# Patient Record
Sex: FEMALE | Race: WHITE | NOT HISPANIC OR LATINO
[De-identification: names, ages, dates, MRNs, and addresses within clinical notes are randomized per-mention and may not be internally consistent; named-entity substitution may affect disease eponyms.]

---

## 2017-03-09 ENCOUNTER — APPOINTMENT (OUTPATIENT)
Dept: PULMONOLOGY | Facility: CLINIC | Age: 32
End: 2017-03-09

## 2017-03-09 VITALS
SYSTOLIC BLOOD PRESSURE: 120 MMHG | WEIGHT: 293 LBS | HEIGHT: 66 IN | RESPIRATION RATE: 16 BRPM | OXYGEN SATURATION: 100 % | HEART RATE: 73 BPM | DIASTOLIC BLOOD PRESSURE: 76 MMHG | BODY MASS INDEX: 47.09 KG/M2

## 2017-03-09 DIAGNOSIS — Z87.19 PERSONAL HISTORY OF OTHER DISEASES OF THE DIGESTIVE SYSTEM: ICD-10-CM

## 2017-03-09 DIAGNOSIS — Z86.39 PERSONAL HISTORY OF OTHER ENDOCRINE, NUTRITIONAL AND METABOLIC DISEASE: ICD-10-CM

## 2017-03-09 DIAGNOSIS — Z78.9 OTHER SPECIFIED HEALTH STATUS: ICD-10-CM

## 2017-03-09 DIAGNOSIS — Z82.49 FAMILY HISTORY OF ISCHEMIC HEART DISEASE AND OTHER DISEASES OF THE CIRCULATORY SYSTEM: ICD-10-CM

## 2017-03-09 DIAGNOSIS — E66.3 OVERWEIGHT: ICD-10-CM

## 2017-04-27 ENCOUNTER — APPOINTMENT (OUTPATIENT)
Dept: PULMONOLOGY | Facility: CLINIC | Age: 32
End: 2017-04-27

## 2017-05-11 ENCOUNTER — APPOINTMENT (OUTPATIENT)
Dept: PULMONOLOGY | Facility: CLINIC | Age: 32
End: 2017-05-11

## 2017-05-11 VITALS
SYSTOLIC BLOOD PRESSURE: 128 MMHG | DIASTOLIC BLOOD PRESSURE: 70 MMHG | WEIGHT: 293 LBS | OXYGEN SATURATION: 99 % | BODY MASS INDEX: 47.09 KG/M2 | HEIGHT: 66 IN | HEART RATE: 68 BPM | RESPIRATION RATE: 15 BRPM

## 2017-05-22 ENCOUNTER — MESSAGE (OUTPATIENT)
Age: 32
End: 2017-05-22

## 2017-06-07 ENCOUNTER — APPOINTMENT (OUTPATIENT)
Dept: THORACIC SURGERY | Facility: CLINIC | Age: 32
End: 2017-06-07

## 2017-06-08 VITALS
SYSTOLIC BLOOD PRESSURE: 135 MMHG | DIASTOLIC BLOOD PRESSURE: 85 MMHG | HEIGHT: 66 IN | RESPIRATION RATE: 15 BRPM | BODY MASS INDEX: 47.09 KG/M2 | OXYGEN SATURATION: 98 % | HEART RATE: 66 BPM | WEIGHT: 293 LBS

## 2017-06-08 RX ORDER — DICLOFENAC SODIUM AND MISOPROSTOL 50; 200 MG/1; UG/1
50-0.2 TABLET, DELAYED RELEASE ORAL
Qty: 60 | Refills: 2 | Status: COMPLETED | COMMUNITY
Start: 2017-05-11 | End: 2017-06-08

## 2017-06-08 RX ORDER — RANITIDINE 300 MG/1
300 TABLET ORAL
Qty: 1 | Refills: 1 | Status: COMPLETED | COMMUNITY
Start: 2017-03-09 | End: 2017-06-08

## 2017-06-08 RX ORDER — DEXLANSOPRAZOLE 60 MG/1
60 CAPSULE, DELAYED RELEASE ORAL
Refills: 0 | Status: COMPLETED | COMMUNITY
End: 2017-06-08

## 2017-07-11 ENCOUNTER — APPOINTMENT (OUTPATIENT)
Dept: PULMONOLOGY | Facility: CLINIC | Age: 32
End: 2017-07-11

## 2017-07-11 VITALS
HEART RATE: 60 BPM | WEIGHT: 293 LBS | BODY MASS INDEX: 47.09 KG/M2 | SYSTOLIC BLOOD PRESSURE: 115 MMHG | DIASTOLIC BLOOD PRESSURE: 80 MMHG | HEIGHT: 66 IN | RESPIRATION RATE: 16 BRPM | OXYGEN SATURATION: 100 %

## 2018-01-08 ENCOUNTER — APPOINTMENT (OUTPATIENT)
Dept: PULMONOLOGY | Facility: CLINIC | Age: 33
End: 2018-01-08
Payer: COMMERCIAL

## 2018-01-08 VITALS
BODY MASS INDEX: 47.09 KG/M2 | HEIGHT: 66 IN | HEART RATE: 79 BPM | SYSTOLIC BLOOD PRESSURE: 126 MMHG | WEIGHT: 293 LBS | DIASTOLIC BLOOD PRESSURE: 80 MMHG | OXYGEN SATURATION: 99 %

## 2018-01-08 PROCEDURE — 94010 BREATHING CAPACITY TEST: CPT

## 2018-01-08 PROCEDURE — 99214 OFFICE O/P EST MOD 30 MIN: CPT | Mod: 25

## 2018-07-13 ENCOUNTER — NON-APPOINTMENT (OUTPATIENT)
Age: 33
End: 2018-07-13

## 2018-07-13 ENCOUNTER — APPOINTMENT (OUTPATIENT)
Dept: PULMONOLOGY | Facility: CLINIC | Age: 33
End: 2018-07-13
Payer: COMMERCIAL

## 2018-07-13 VITALS
WEIGHT: 293 LBS | OXYGEN SATURATION: 98 % | SYSTOLIC BLOOD PRESSURE: 110 MMHG | RESPIRATION RATE: 17 BRPM | BODY MASS INDEX: 47.09 KG/M2 | HEART RATE: 77 BPM | DIASTOLIC BLOOD PRESSURE: 70 MMHG | HEIGHT: 66 IN

## 2018-07-13 PROCEDURE — 99214 OFFICE O/P EST MOD 30 MIN: CPT | Mod: 25

## 2018-07-13 PROCEDURE — 94010 BREATHING CAPACITY TEST: CPT

## 2019-01-15 ENCOUNTER — NON-APPOINTMENT (OUTPATIENT)
Age: 34
End: 2019-01-15

## 2019-01-15 ENCOUNTER — APPOINTMENT (OUTPATIENT)
Dept: PULMONOLOGY | Facility: CLINIC | Age: 34
End: 2019-01-15
Payer: COMMERCIAL

## 2019-01-15 VITALS
HEIGHT: 66 IN | RESPIRATION RATE: 17 BRPM | WEIGHT: 293 LBS | SYSTOLIC BLOOD PRESSURE: 110 MMHG | HEART RATE: 71 BPM | OXYGEN SATURATION: 98 % | BODY MASS INDEX: 47.09 KG/M2 | DIASTOLIC BLOOD PRESSURE: 80 MMHG

## 2019-01-15 PROCEDURE — 94010 BREATHING CAPACITY TEST: CPT

## 2019-01-15 PROCEDURE — 99214 OFFICE O/P EST MOD 30 MIN: CPT | Mod: 25

## 2019-01-15 NOTE — REVIEW OF SYSTEMS
[Negative] : Sleep Disorder [Dyspnea] : dyspnea [As Noted in HPI] : as noted in HPI [Myalgias] : myalgias [FreeTextEntry8] : RAMIREZ

## 2019-01-15 NOTE — HISTORY OF PRESENT ILLNESS
[FreeTextEntry1] : Ms. Leroy is a 33 year old female presenting to the office for a follow up visit for abnormal PFTs, GERD, snoring and chest pain. Her chief complaint is RAMIREZ\par -she states she is moving to a better location in [place]\par -she notes her energy level is good 8-9/10\par -she states she sleeps well, 6.5-7 hours each night, including weekends\par -she states her sleep is not interrupted\par -she states she believes her collar bone pain may be muscular\par -she notes she sleeps on her stomach\par -she states she has neck pain\par -she states her weight is stable, would like to lose weight\par -she states she takes Tums for reflux with tomato based foods or wine\par -she states she has RAMIREZ with inclines\par -she notes her bowels are regular\par -she states her sense of smell and taste are normal\par -she denies any headaches, nausea, vomiting, fever, chills, sweats, chest pain, chest pressure, diarrhea, constipation, dysphagia, dizziness, leg swelling, leg pain, itchy eyes, itchy ears, heartburn, reflux, or sour taste in the mouth.

## 2019-01-15 NOTE — ADDENDUM
[FreeTextEntry1] : Documented by Branden Powres acting as a scribe for Dr. Yg Barnes on 01/15/2019.\par \par All medical record entries made by the Scribe were at my, Dr. Yg Barnes's, direction and personally dictated by me on 01/15/2019. I have reviewed the chart and agree that the record accurately reflects my personal performance of the history, physical exam, assessment and plan. I have also personally directed, reviewed, and agree with the discharge instructions.

## 2019-01-15 NOTE — PHYSICAL EXAM
[General Appearance - Well Developed] : well developed [Normal Appearance] : normal appearance [Well Groomed] : well groomed [General Appearance - Well Nourished] : well nourished [No Deformities] : no deformities [General Appearance - In No Acute Distress] : no acute distress [Normal Conjunctiva] : the conjunctiva exhibited no abnormalities [Eyelids - No Xanthelasma] : the eyelids demonstrated no xanthelasmas [Normal Oropharynx] : normal oropharynx [III] : III [Neck Appearance] : the appearance of the neck was normal [Neck Cervical Mass (___cm)] : no neck mass was observed [Jugular Venous Distention Increased] : there was no jugular-venous distention [Thyroid Diffuse Enlargement] : the thyroid was not enlarged [Thyroid Nodule] : there were no palpable thyroid nodules [Heart Rate And Rhythm] : heart rate and rhythm were normal [Heart Sounds] : normal S1 and S2 [Murmurs] : no murmurs present [Respiration, Rhythm And Depth] : normal respiratory rhythm and effort [Exaggerated Use Of Accessory Muscles For Inspiration] : no accessory muscle use [Auscultation Breath Sounds / Voice Sounds] : lungs were clear to auscultation bilaterally [Abdomen Soft] : soft [Abdomen Tenderness] : non-tender [Abdomen Mass (___ Cm)] : no abdominal mass palpated [Abnormal Walk] : normal gait [Gait - Sufficient For Exercise Testing] : the gait was sufficient for exercise testing [Nail Clubbing] : no clubbing of the fingernails [Cyanosis, Localized] : no localized cyanosis [Petechial Hemorrhages (___cm)] : no petechial hemorrhages [Skin Color & Pigmentation] : normal skin color and pigmentation [] : no rash [No Venous Stasis] : no venous stasis [Skin Lesions] : no skin lesions [No Skin Ulcers] : no skin ulcer [No Xanthoma] : no  xanthoma was observed [Deep Tendon Reflexes (DTR)] : deep tendon reflexes were 2+ and symmetric [Sensation] : the sensory exam was normal to light touch and pinprick [No Focal Deficits] : no focal deficits [Oriented To Time, Place, And Person] : oriented to person, place, and time [Impaired Insight] : insight and judgment were intact [Affect] : the affect was normal [FreeTextEntry1] : reproducible L subclavicular pain

## 2019-01-15 NOTE — PROCEDURE
[FreeTextEntry1] : PFT-spi reveals normal flows with FEV1 of 3.42     , which is 103  % of predicted, normal flow volume loop

## 2019-01-15 NOTE — ASSESSMENT
[FreeTextEntry1] : Ms. Dyer is currently stable from a pulmonary perspective, stable job, improved L chest, abdominal pain, chest pain\par \par Problem 1: L sided abdominal pain (mildly present) -c/w MJ\par -recommended Topricin cream\par -relatively resolved \par -c/w musculoskeletal pain (?costochondritis)\par -continue  Arthrotec PRN\par -can continue to use Arnica cream \par -demonstrated some stretches for the pt\par -recommended to try massage therapy\par \par Problem 2: Obesity\par -recommended diet\par -discussed mindful vs mindless eating\par -Weight loss, exercise, and diet control were discussed and are highly encouraged. Treatment options were given such as, aqua therapy, and contacting a nutritionist. Recommended to use the elliptical, stationary bike, less use of treadmill. Obesity is associated with worsening asthma, shortness of breath, and potential for cardiac disease, diabetes, and other underlying medical conditions. \par \par Problem 3:primary snoring\par -recommended to use positional sleep \par \par Problem 4: Reflux/Esophageal ulcer/GERD\par -continue OTC Rx PRN\par -diet control\par \par -discussed Rule of 2's; pt should avoid eating too much; too fast; too spicy; too lousy; less than two hours before bed\par -Things to avoid including overeating, spicy foods, tight clothing, eating within three hours of bed, this list is not all inclusive. \par -For treatment of reflux, possible options discussed including diet control, H2 blockers, PPIs, as well as coating motility agents discussed as treatment options. Timing of meals and proximity of last meal to sleep were discussed. If symptoms persist, a formal gastrointestinal evaluation is needed. \par \par Problem 5: Abnormal PFT's (flattened inspiratory limb)\par -have normalized and improved \par \par problem 6: SOB\par -c/w poor mechanics of breathing\par Proper breathing techniques were reviewed with an emphasis of exhalation. Patient instructed to breath in\par for 1 second and out for four seconds. Patient was encouraged to not talk while walking. \par -out of shape\par -medicines reviewed\par \par problem 7: health maintenance \par -recommended yearly flu shot ?2019\par -recommended strep pneumonia vaccines: Prevnar-13 vaccine, followed by Pneumo vaccine 23 one year following\par -recommended early intervention for URIs\par -recommended regular osteoporosis evaluations\par -recommended early dermatological evaluations\par -recommended after the age of 50 to the age of 70, colonoscopy every 5 years \par \par F/U in x6 months \par She is encouraged to call with any changes, concerns, or questions. \par

## 2019-01-15 NOTE — REASON FOR VISIT
[Follow-Up] : a follow-up visit [FreeTextEntry1] : abnormal PFTs, GERD, snoring, chest pain, RAMIREZ

## 2019-10-31 ENCOUNTER — NON-APPOINTMENT (OUTPATIENT)
Age: 34
End: 2019-10-31

## 2019-10-31 ENCOUNTER — APPOINTMENT (OUTPATIENT)
Dept: PULMONOLOGY | Facility: CLINIC | Age: 34
End: 2019-10-31
Payer: COMMERCIAL

## 2019-10-31 VITALS
RESPIRATION RATE: 17 BRPM | HEIGHT: 66 IN | HEART RATE: 76 BPM | SYSTOLIC BLOOD PRESSURE: 118 MMHG | WEIGHT: 293 LBS | OXYGEN SATURATION: 98 % | BODY MASS INDEX: 47.09 KG/M2 | DIASTOLIC BLOOD PRESSURE: 80 MMHG

## 2019-10-31 PROCEDURE — 94010 BREATHING CAPACITY TEST: CPT

## 2019-10-31 PROCEDURE — 99214 OFFICE O/P EST MOD 30 MIN: CPT | Mod: 25

## 2019-10-31 NOTE — HISTORY OF PRESENT ILLNESS
[FreeTextEntry1] : Ms. Leroy is a 33 year old female presenting to the office for a follow up visit for abnormal PFTs, GERD, snoring and chest pain. Her chief complaint is her musculoskeletal tightness in her shoulder/neck area. \par \par - States that she is feeling well overall but has a residual cough \par - Energy level is an 8/10\par - Exercise is her normal commute  but she states she needs to do more exercise\par - She states she sleeps on her stomach and reports some musculoskeletal tightness in her shoulders/neck area\par - She states that she could eat better/fruits and vegetables\par - denies any new medications and she states she took some elderberry for the cough and also uses NyQuil\par - Sometimes when she lays down, she states she has cough\par - denies post nasal drip \par - she states that her stomach pain has improved since before \par - no longer experiencing heartburn, reflux and snoring \par \par denies any  chest pain, chest pressure, diarrhea, constipation, dysphagia, dizziness, leg swelling, leg pain, itchy eyes, itchy ears, heartburn, reflux, or sour taste in the mouth.\par

## 2019-10-31 NOTE — ADDENDUM
[FreeTextEntry1] : Documented by Georges Chowdhury acting as a scribe for Dr. Yg Barnes on 10/31/2019 \par \par All medical record entries made by the Scribe were at my, Dr. Yg Barnes's, direction and personally dictated by me on 10/31/2019 . I have reviewed the chart and agree that the record accurately reflects my personal performance of the history, physical exam, assessment and plan. I have also personally directed, reviewed, and agree with the discharge instructions.\par

## 2019-10-31 NOTE — PROCEDURE
[FreeTextEntry1] : PFT revealed normal flows, with a FEV1 of  3.66L, which is 111% of predicted, with a normal flow volume loop. \par \par \par

## 2019-10-31 NOTE — REVIEW OF SYSTEMS
[Dyspnea] : dyspnea [As Noted in HPI] : as noted in HPI [Myalgias] : myalgias [Negative] : Sleep Disorder [FreeTextEntry8] : RAMIREZ

## 2019-10-31 NOTE — ASSESSMENT
[FreeTextEntry1] : Ms. Dyer is currently stable from a pulmonary perspective, stable SOB, improved (L chest, abdominal pain, chest pain) now residual cough. \par \par Problem 1: L sided abdominal pain (mildly present) -c/w MS\par -recommended Topricin cream\par -relatively resolved \par -c/w musculoskeletal pain (?costochondritis)\par -continue  Arthrotec PRN\par -can continue to use Arnica cream/Australian Dream\par -demonstrated some stretches for the pt\par -recommended to try massage therapy\par \par Problem 2: Obesity\par -recommended diet\par -discussed mindful vs mindless eating\par -Weight loss, exercise, and diet control were discussed and are highly encouraged. Treatment options were given such as, aqua therapy, and contacting a nutritionist. Recommended to use the elliptical, stationary bike, less use of treadmill. Obesity is associated with worsening asthma, shortness of breath, and potential for cardiac disease, diabetes, and other underlying medical conditions. \par \par Problem 3:primary snoring\par -recommended to use positional sleep \par \par Problem 4: Reflux/Esophageal ulcer/GERD\par -continue OTC Rx PRN\par -diet control\par \par -discussed Rule of 2's; pt should avoid eating too much; too fast; too spicy; too lousy; less than two hours before bed\par -Things to avoid including overeating, spicy foods, tight clothing, eating within three hours of bed, this list is not all inclusive. \par -For treatment of reflux, possible options discussed including diet control, H2 blockers, PPIs, as well as coating motility agents discussed as treatment options. Timing of meals and proximity of last meal to sleep were discussed. If symptoms persist, a formal gastrointestinal evaluation is needed. \par \par Problem 5: Abnormal PFT's (flattened inspiratory limb)\par -have normalized and improved \par \par problem 6: SOB\par -c/w poor mechanics of breathing\par Proper breathing techniques were reviewed with an emphasis of exhalation. Patient instructed to breath in\par for 1 second and out for four seconds. Patient was encouraged to not talk while walking. \par -out of shape\par -medicines reviewed\par \par problem 7: health maintenance \par -recommended yearly flu shot ?2019\par -recommended strep pneumonia vaccines: Prevnar-13 vaccine, followed by Pneumo vaccine 23 one year following\par -recommended early intervention for URIs\par -recommended regular osteoporosis evaluations\par -recommended early dermatological evaluations\par -recommended after the age of 50 to the age of 70, colonoscopy every 5 years \par \par F/U in x6 months \par She is encouraged to call with any changes, concerns, or questions. \par

## 2020-04-23 ENCOUNTER — APPOINTMENT (OUTPATIENT)
Dept: PULMONOLOGY | Facility: CLINIC | Age: 35
End: 2020-04-23
Payer: COMMERCIAL

## 2020-04-23 VITALS
HEART RATE: 86 BPM | TEMPERATURE: 98.8 F | BODY MASS INDEX: 47.09 KG/M2 | WEIGHT: 293 LBS | RESPIRATION RATE: 17 BRPM | HEIGHT: 66 IN | SYSTOLIC BLOOD PRESSURE: 120 MMHG | OXYGEN SATURATION: 98 % | DIASTOLIC BLOOD PRESSURE: 70 MMHG

## 2020-04-23 PROCEDURE — 71046 X-RAY EXAM CHEST 2 VIEWS: CPT

## 2020-04-23 PROCEDURE — 99214 OFFICE O/P EST MOD 30 MIN: CPT | Mod: 25

## 2020-04-23 NOTE — PHYSICAL EXAM
[Well Groomed] : well groomed [Normal Appearance] : normal appearance [General Appearance - Well Developed] : well developed [General Appearance - Well Nourished] : well nourished [No Deformities] : no deformities [General Appearance - In No Acute Distress] : no acute distress [Normal Conjunctiva] : the conjunctiva exhibited no abnormalities [Eyelids - No Xanthelasma] : the eyelids demonstrated no xanthelasmas [Normal Oropharynx] : normal oropharynx [III] : III [Neck Appearance] : the appearance of the neck was normal [Jugular Venous Distention Increased] : there was no jugular-venous distention [Neck Cervical Mass (___cm)] : no neck mass was observed [Thyroid Diffuse Enlargement] : the thyroid was not enlarged [Thyroid Nodule] : there were no palpable thyroid nodules [Heart Rate And Rhythm] : heart rate and rhythm were normal [Heart Sounds] : normal S1 and S2 [Murmurs] : no murmurs present [Respiration, Rhythm And Depth] : normal respiratory rhythm and effort [Exaggerated Use Of Accessory Muscles For Inspiration] : no accessory muscle use [Auscultation Breath Sounds / Voice Sounds] : lungs were clear to auscultation bilaterally [Abdomen Soft] : soft [Abdomen Tenderness] : non-tender [Abdomen Mass (___ Cm)] : no abdominal mass palpated [Gait - Sufficient For Exercise Testing] : the gait was sufficient for exercise testing [Abnormal Walk] : normal gait [Nail Clubbing] : no clubbing of the fingernails [Cyanosis, Localized] : no localized cyanosis [Petechial Hemorrhages (___cm)] : no petechial hemorrhages [Skin Color & Pigmentation] : normal skin color and pigmentation [No Venous Stasis] : no venous stasis [] : no rash [Skin Lesions] : no skin lesions [No Skin Ulcers] : no skin ulcer [No Xanthoma] : no  xanthoma was observed [Deep Tendon Reflexes (DTR)] : deep tendon reflexes were 2+ and symmetric [No Focal Deficits] : no focal deficits [Sensation] : the sensory exam was normal to light touch and pinprick [Affect] : the affect was normal [Impaired Insight] : insight and judgment were intact [Oriented To Time, Place, And Person] : oriented to person, place, and time [FreeTextEntry1] : I:E 1:3, clear

## 2020-04-23 NOTE — ADDENDUM
[FreeTextEntry1] : Documented by Della Mcdowell acting as a scribe for Dr. Yg Barnes on 04/23/2020.\par \par All medical record entries made by the Scribe were at my, Dr. Yg Barnes's, direction and personally dictated by me on 04/23/2020. I have reviewed the chart and agree that the record accurately reflects my personal performance of the history, physical exam, assessment and plan. I have also personally directed, reviewed, and agree with the discharge instructions.\par

## 2020-04-23 NOTE — REVIEW OF SYSTEMS
[Negative] : Psychiatric [Recent Wt Loss (___ Lbs)] : ~T recent [unfilled] lb weight loss [Ear Disturbance] : ear disturbance [Sore Throat] : sore throat [Postnasal Drip] : postnasal drip [Cough] : cough [Fever] : no fever [Chills] : no chills [Fatigue] : no fatigue [Poor Appetite] : no poor appetite [Hemoptysis] : no hemoptysis [Chest Tightness] : no chest tightness [Sputum] : no sputum [Pleuritic Pain] : no pleuritic pain [Dyspnea] : no dyspnea [Wheezing] : no wheezing

## 2020-04-23 NOTE — ASSESSMENT
[FreeTextEntry1] : Ms. Dyer has a hx of costrochindritis, obesity, GERD who currently stable from a pulmonary perspective, stable SOB, improved (L chest, abdominal pain, chest pain) now residual cough - ?pnd syndrome \par \par \par Problem 1: Cough / RADS vs PND syndrome \par - Add Advair (200) 1 inhalation BID\par -Inhaler technique reviewed as well as oral hygiene techniques reviewed with patient. Avoidance of cold air, extremes of temperature, rescue inhaler should be used before exercise. Order of medication reviewed with patient. Recommended use of a cool mist humidifier in the bedroom.\par \par Problem 2: L sided abdominal pain (mildly present) -c/w MS\par -recommended Topricin cream\par -relatively resolved \par -c/w musculoskeletal pain (?costochondritis)\par -continue  Arthrotec PRN\par -can continue to use Arnica cream/Australian Dream\par -demonstrated some stretches for the pt\par -recommended to try massage therapy\par \par Problem 3: Obesity - improved\par - on weight watchers - lost 30 lbs. \par -recommended diet\par -discussed mindful vs mindless eating\par -Weight loss, exercise, and diet control were discussed and are highly encouraged. Treatment options were given such as, aqua therapy, and contacting a nutritionist. Recommended to use the elliptical, stationary bike, less use of treadmill. Obesity is associated with worsening asthma, shortness of breath, and potential for cardiac disease, diabetes, and other underlying medical conditions. \par \par Problem 4:primary snoring\par -recommended to use positional sleep \par \par Problem 5: Reflux/Esophageal ulcer/GERD\par -continue OTC Rx PRN\par -diet control\par \par -discussed Rule of 2's; pt should avoid eating too much; too fast; too spicy; too lousy; less than two hours before bed\par -Things to avoid including overeating, spicy foods, tight clothing, eating within three hours of bed, this list is not all inclusive. \par -For treatment of reflux, possible options discussed including diet control, H2 blockers, PPIs, as well as coating motility agents discussed as treatment options. Timing of meals and proximity of last meal to sleep were discussed. If symptoms persist, a formal gastrointestinal evaluation is needed. \par \par Problem 6: Abnormal PFT's (flattened inspiratory limb)\par -have normalized and improved \par \par problem 7: SOB\par -c/w poor mechanics of breathing\par Proper breathing techniques were reviewed with an emphasis of exhalation. Patient instructed to breath in\par for 1 second and out for four seconds. Patient was encouraged to not talk while walking. \par -out of shape\par -medicines reviewed\par \par problem 8: health maintenance \par Immune Support Recommendations:\par -OTC Vitamin C 500mg BID \par -OTC Quercetin 250-500mg BID \par -OTC Zinc 75-100mg per day \par -OTC Melatonin 1 or 2 mg a night \par -OTC Vitamin D 1-4000mg per day \par -OTC Tonic Water 8oz per day\par -recommended yearly flu shot ?2019\par -recommended strep pneumonia vaccines: Prevnar-13 vaccine, followed by Pneumo vaccine 23 one year following\par -recommended early intervention for URIs\par -recommended regular osteoporosis evaluations\par -recommended early dermatological evaluations\par -recommended after the age of 50 to the age of 70, colonoscopy every 5 years \par \par F/U in x6 months \par She is encouraged to call with any changes, concerns, or questions. \par  PAIN SCALE 5 OF 10.

## 2020-04-23 NOTE — HISTORY OF PRESENT ILLNESS
[FreeTextEntry1] : Ms. Leroy is a 35 year old female presenting to the office for a follow up visit for abnormal PFTs, GERD, snoring and chest pain. Her chief complaint is dry cough \par - she notes she has a little bit of a cough, it started 2 weeks ago. She usually would not be concerned about it if it were not happening during the COVID19 pandemic.\par - she notes shes been taking Dayquil \par - she notes some diarrhea\par - she notes the cough is dry and no mucous is coming up. \par - she denies any palpitations\par - she denies wheezing\par - she notes she sees an en endocrinologist that recommended some medications to her and she just wants a second opinion because she does not want to really take the medications. [facility] \par - she notes several weeks ago she started weight watchers and she lost 30 lbs. \par - she notes some PND along with a sore throat and an ear ache. \par - she denies SOB. \par - she notes the cough is not productive \par - she notes some chest pain and she has been using topricin which has had it feel a bit better. \par -she denies any headaches, nausea, vomiting, fever, chills, sweats, chest pain, chest pressure, constipation, dysphagia, dizziness, sour taste in the mouth, leg swelling, leg pain, itchy eyes, itchy ears.

## 2020-04-28 ENCOUNTER — TRANSCRIPTION ENCOUNTER (OUTPATIENT)
Age: 35
End: 2020-04-28

## 2020-05-04 ENCOUNTER — TRANSCRIPTION ENCOUNTER (OUTPATIENT)
Age: 35
End: 2020-05-04

## 2020-05-08 ENCOUNTER — TRANSCRIPTION ENCOUNTER (OUTPATIENT)
Age: 35
End: 2020-05-08

## 2020-06-02 ENCOUNTER — APPOINTMENT (OUTPATIENT)
Dept: PULMONOLOGY | Facility: CLINIC | Age: 35
End: 2020-06-02
Payer: COMMERCIAL

## 2020-06-02 VITALS
HEART RATE: 78 BPM | DIASTOLIC BLOOD PRESSURE: 70 MMHG | BODY MASS INDEX: 47.09 KG/M2 | RESPIRATION RATE: 17 BRPM | TEMPERATURE: 97.9 F | SYSTOLIC BLOOD PRESSURE: 100 MMHG | OXYGEN SATURATION: 98 % | WEIGHT: 293 LBS | HEIGHT: 66 IN

## 2020-06-02 DIAGNOSIS — Z86.69 PERSONAL HISTORY OF OTHER DISEASES OF THE NERVOUS SYSTEM AND SENSE ORGANS: ICD-10-CM

## 2020-06-02 PROCEDURE — 99214 OFFICE O/P EST MOD 30 MIN: CPT

## 2020-06-02 NOTE — HISTORY OF PRESENT ILLNESS
[FreeTextEntry1] : Ms. Leroy is a 35 year old female presenting to the office for a follow up visit for abnormal PFTs, GERD, snoring and chest pain. Her chief complaint is\par - she has been having some ear / throat discomfort. she feels like when she contacted the office, she did not like her inhaler she feels it irritated her. Her cough is good, but it lingers a little. \par - two days ago she had a lot of trouble swallowing so she has been drinking a lot of fluids to help \par - she denies any palpitations \par - her energy levels are about 7 / 8 out of 10 \par - she has been losing weight, shes almost down 50 lbs. \par - she has been taking her inhalers properly. \par -she denies any headaches, nausea, vomiting, fever, chills, sweats, chest pain, chest pressure, diarrhea, constipation, dizziness, sour taste in the mouth, leg swelling, leg pain, heartburn, reflux, myalgias or arthralgias.

## 2020-06-02 NOTE — PHYSICAL EXAM
[General Appearance - Well Developed] : well developed [Normal Appearance] : normal appearance [General Appearance - Well Nourished] : well nourished [Well Groomed] : well groomed [No Deformities] : no deformities [General Appearance - In No Acute Distress] : no acute distress [Normal Conjunctiva] : the conjunctiva exhibited no abnormalities [Eyelids - No Xanthelasma] : the eyelids demonstrated no xanthelasmas [Normal Oropharynx] : normal oropharynx [Neck Appearance] : the appearance of the neck was normal [Jugular Venous Distention Increased] : there was no jugular-venous distention [Neck Cervical Mass (___cm)] : no neck mass was observed [Thyroid Nodule] : there were no palpable thyroid nodules [Thyroid Diffuse Enlargement] : the thyroid was not enlarged [Heart Sounds] : normal S1 and S2 [Heart Rate And Rhythm] : heart rate and rhythm were normal [Murmurs] : no murmurs present [Respiration, Rhythm And Depth] : normal respiratory rhythm and effort [Auscultation Breath Sounds / Voice Sounds] : lungs were clear to auscultation bilaterally [Exaggerated Use Of Accessory Muscles For Inspiration] : no accessory muscle use [Abdomen Soft] : soft [Abdomen Tenderness] : non-tender [Abdomen Mass (___ Cm)] : no abdominal mass palpated [Abnormal Walk] : normal gait [Gait - Sufficient For Exercise Testing] : the gait was sufficient for exercise testing [Nail Clubbing] : no clubbing of the fingernails [Cyanosis, Localized] : no localized cyanosis [Petechial Hemorrhages (___cm)] : no petechial hemorrhages [Skin Color & Pigmentation] : normal skin color and pigmentation [No Venous Stasis] : no venous stasis [] : no rash [Skin Lesions] : no skin lesions [No Skin Ulcers] : no skin ulcer [Deep Tendon Reflexes (DTR)] : deep tendon reflexes were 2+ and symmetric [No Xanthoma] : no  xanthoma was observed [Sensation] : the sensory exam was normal to light touch and pinprick [No Focal Deficits] : no focal deficits [Impaired Insight] : insight and judgment were intact [Oriented To Time, Place, And Person] : oriented to person, place, and time [Affect] : the affect was normal [II] : II [FreeTextEntry1] : reproducible L subclavicular pain

## 2020-06-02 NOTE — ASSESSMENT
[FreeTextEntry1] : Ms. Leroy is a a 35 year old female who has a hx of costochondritis, obesity, GERD who currently stable from a pulmonary perspective, stable SOB, improved (L chest, abdominal pain, chest pain) -  left ear and throat c/w Otitis pharyngitis \par \par Problem 1A: Otitis Pharyngitis \par - Add  Augmentin 875 BID \par - Recommended Fishermans lozenges \par You have the clinical scenario most c/q a throat infection the etiology of which is unknown (viral/bacterial/fungal); hydration recommended.\par \par \par Problem 1: Cough / RADS vs PND syndrome \par - continue Advair (250) 1 inhalation BID\par -Inhaler technique reviewed as well as oral hygiene techniques reviewed with patient. Avoidance of cold air, extremes of temperature, rescue inhaler should be used before exercise. Order of medication reviewed with patient. Recommended use of a cool mist humidifier in the bedroom.\par \par Problem 2: L sided abdominal pain (mildly present) -c/w MS\par -recommended Topricin cream\par -relatively resolved \par -c/w musculoskeletal pain (?costochondritis)\par -continue  Arthrotec PRN\par -can continue to use Arnica cream/Australian Dream\par -demonstrated some stretches for the pt\par -recommended to try massage therapy\par \par Problem 3: Obesity - improved\par - on weight watchers - lost 50 lbs. \par -recommended diet\par -discussed mindful vs mindless eating\par -Weight loss, exercise, and diet control were discussed and are highly encouraged. Treatment options were given such as, aqua therapy, and contacting a nutritionist. Recommended to use the elliptical, stationary bike, less use of treadmill. Obesity is associated with worsening asthma, shortness of breath, and potential for cardiac disease, diabetes, and other underlying medical conditions. \par \par Problem 4:primary snoring\par -recommended to use positional sleep \par \par Problem 5: Reflux/Esophageal ulcer/GERD\par -continue OTC Rx PRN\par -diet control\par \par -discussed Rule of 2's; pt should avoid eating too much; too fast; too spicy; too lousy; less than two hours before bed\par -Things to avoid including overeating, spicy foods, tight clothing, eating within three hours of bed, this list is not all inclusive. \par -For treatment of reflux, possible options discussed including diet control, H2 blockers, PPIs, as well as coating motility agents discussed as treatment options. Timing of meals and proximity of last meal to sleep were discussed. If symptoms persist, a formal gastrointestinal evaluation is needed. \par \par Problem 6: Abnormal PFT's (flattened inspiratory limb)\par -have normalized and improved \par \par problem 7: SOB\par -c/w poor mechanics of breathing\par Proper breathing techniques were reviewed with an emphasis of exhalation. Patient instructed to breath in\par for 1 second and out for four seconds. Patient was encouraged to not talk while walking. \par -out of shape\par -medicines reviewed\par \par problem 8: health maintenance / COVID-19 precautions\par Immune Support Recommendations:\par -OTC Vitamin C 500mg BID \par -OTC Quercetin 250-500mg BID \par -OTC Zinc 75-100mg per day \par -OTC Melatonin 1 or 2 mg a night \par -OTC Vitamin D 1-4000mg per day \par -OTC Tonic Water 8oz per day\par -recommended yearly flu shot ?2019\par -recommended strep pneumonia vaccines: Prevnar-13 vaccine, followed by Pneumo vaccine 23 one year following\par -recommended early intervention for URIs\par -recommended regular osteoporosis evaluations\par -recommended early dermatological evaluations\par -recommended after the age of 50 to the age of 70, colonoscopy every 5 years \par \par F/U in x6 months \par She is encouraged to call with any changes, concerns, or questions. \par

## 2020-06-02 NOTE — ADDENDUM
[FreeTextEntry1] : Documented by Della Mcdowell acting as a scribe for Dr. Yg Barnes on 06/02/2020.\par \par All medical record entries made by the Scribe were at my, Dr. Yg Barnes's, direction and personally dictated by me on 06/02/2020. I have reviewed the chart and agree that the record accurately reflects my personal performance of the history, physical exam, assessment and plan. I have also personally directed, reviewed, and agree with the discharge instructions.

## 2020-10-18 ENCOUNTER — RESULT REVIEW (OUTPATIENT)
Age: 35
End: 2020-10-18

## 2020-10-22 ENCOUNTER — APPOINTMENT (OUTPATIENT)
Dept: PULMONOLOGY | Facility: CLINIC | Age: 35
End: 2020-10-22
Payer: COMMERCIAL

## 2020-10-22 VITALS
HEART RATE: 66 BPM | TEMPERATURE: 97.5 F | RESPIRATION RATE: 17 BRPM | BODY MASS INDEX: 41.46 KG/M2 | SYSTOLIC BLOOD PRESSURE: 120 MMHG | HEIGHT: 66 IN | WEIGHT: 258 LBS | OXYGEN SATURATION: 99 % | DIASTOLIC BLOOD PRESSURE: 75 MMHG

## 2020-10-22 PROCEDURE — 99214 OFFICE O/P EST MOD 30 MIN: CPT | Mod: 25

## 2020-10-22 PROCEDURE — 90682 RIV4 VACC RECOMBINANT DNA IM: CPT

## 2020-10-22 PROCEDURE — 99072 ADDL SUPL MATRL&STAF TM PHE: CPT

## 2020-10-22 PROCEDURE — G0008: CPT

## 2020-10-22 RX ORDER — AMOXICILLIN AND CLAVULANATE POTASSIUM 875; 125 MG/1; MG/1
875-125 TABLET, COATED ORAL
Qty: 28 | Refills: 0 | Status: DISCONTINUED | COMMUNITY
Start: 2020-06-02 | End: 2020-10-22

## 2020-10-22 NOTE — ADDENDUM
[FreeTextEntry1] : Documented by Della Mcdowell acting as a scribe for Dr. Yg Barnes on 10/22/2020 \par \par All medical record entries made by the Scribe were at my, Dr. Yg Barnes's, direction and personally dictated by me on 10/22/2020 . I have reviewed the chart and agree that the record accurately reflects my personal performance of the history, physical exam, assessment and plan. I have also personally directed, reviewed, and agree with the discharge instructions.

## 2020-10-22 NOTE — ASSESSMENT
[FreeTextEntry1] : Ms. Leroy is a a 35 year old female who has a hx of costochondritis, obesity, GERD who currently stable from a pulmonary perspective, stable SOB, improved (L chest, abdominal pain, chest pain) - otic symptoms. \par \par \par \par Problem 1: Cough / RADS vs PND syndrome \par - continue Advair (250) 1 inhalation BID\par -Inhaler technique reviewed as well as oral hygiene techniques reviewed with patient. Avoidance of cold air, extremes of temperature, rescue inhaler should be used before exercise. Order of medication reviewed with patient. Recommended use of a cool mist humidifier in the bedroom.\par \par Problem 1a: PND syndrome \par -   -add Qnasl 1 sniff BID \par \par Problem 2: L sided abdominal pain (resolved)-c/w MS\par -recommended Topricin cream\par -relatively resolved \par -c/w musculoskeletal pain (?costochondritis)\par -continue  Arthrotec PRN\par -can continue to use Arnica cream/Australian Dream\par -demonstrated some stretches for the pt\par -recommended to try massage therapy\par \par Problem 3: Obesity - improved\par - on weight watchers - lost 80+ lbs. \par -recommended diet\par -discussed mindful vs mindless eating\par -Weight loss, exercise, and diet control were discussed and are highly encouraged. Treatment options were given such as, aqua therapy, and contacting a nutritionist. Recommended to use the elliptical, stationary bike, less use of treadmill. Obesity is associated with worsening asthma, shortness of breath, and potential for cardiac disease, diabetes, and other underlying medical conditions. \par \par Problem 4:primary snoring\par -recommended to use positional sleep \par \par Problem 5: Reflux/Esophageal ulcer/GERD\par -continue OTC Rx PRN\par -diet control\par \par -discussed Rule of 2's; pt should avoid eating too much; too fast; too spicy; too lousy; less than two hours before bed\par -Things to avoid including overeating, spicy foods, tight clothing, eating within three hours of bed, this list is not all inclusive. \par -For treatment of reflux, possible options discussed including diet control, H2 blockers, PPIs, as well as coating motility agents discussed as treatment options. Timing of meals and proximity of last meal to sleep were discussed. If symptoms persist, a formal gastrointestinal evaluation is needed. \par \par Problem 6: Abnormal PFT's (flattened inspiratory limb)\par -have normalized and improved \par \par problem 7: SOB\par -c/w poor mechanics of breathing\par Proper breathing techniques were reviewed with an emphasis of exhalation. Patient instructed to breath in\par for 1 second and out for four seconds. Patient was encouraged to not talk while walking. \par -out of shape\par -medicines reviewed\par \par problem 8: health maintenance / COVID-19 precautions\par Immune Support Recommendations:\par -OTC Vitamin C 500mg BID \par -OTC Quercetin 250-500mg BID \par -OTC Zinc 75-100mg per day \par -OTC Melatonin 1 or 2 mg a night \par -OTC Vitamin D 1-4000mg per day \par -OTC Tonic Water 8oz per day\par -recommended yearly flu shot - 10/22/2020\par -recommended strep pneumonia vaccines: Prevnar-13 vaccine, followed by Pneumo vaccine 23 one year following\par -recommended early intervention for URIs\par -recommended regular osteoporosis evaluations\par -recommended early dermatological evaluations\par -recommended after the age of 50 to the age of 70, colonoscopy every 5 years \par \par F/U in x6 months \par She is encouraged to call with any changes, concerns, or questions. \par

## 2020-10-22 NOTE — HISTORY OF PRESENT ILLNESS
[FreeTextEntry1] : Ms. Leroy is a 35 year old female presenting to the office for a follow up visit for abnormal PFTs, GERD, snoring and chest pain. Her chief complaint is\par - she lost 44 lbs since the last time she was seen but since March she has lost 87 lbs\par - her energy levels have been good\par - no palpiations\par - no heart burn / reflux \par - she has been exercising everyday. She does 2-4 miles a day walking, Pilates, and weight lifting. \par - she gets 6-8 hours of sleep, uninterrupted\par - does not snore\par - she has been working from home \par - no chest, back, or neck pain\par - no SOB \par - her sinuses have been normal\par - when she was here last time she was given abx because of the inflammation in her ears, whenever she feels anything her ears hurt a little bit. She notes her ears are probably very sensitive. Amoxicillin cleared it up well the last time. \par - the pain in her LLQ has been okay. \par - She  denies any visual issues, headaches, nausea, vomiting, fever, chills, sweats, chest pains, chest pressure, diarrhea, constipation, dysphagia, myalgia, dizziness, leg swelling, leg pain, itchy eyes, itchy ears, heartburn, reflux, or sour taste in the mouth.

## 2020-10-22 NOTE — PHYSICAL EXAM

## 2020-11-29 ENCOUNTER — RESULT REVIEW (OUTPATIENT)
Age: 35
End: 2020-11-29

## 2020-12-23 PROBLEM — Z86.69 HISTORY OF ACUTE OTITIS MEDIA: Status: RESOLVED | Noted: 2020-06-02 | Resolved: 2020-12-23

## 2021-04-12 DIAGNOSIS — Z01.812 ENCOUNTER FOR PREPROCEDURAL LABORATORY EXAMINATION: ICD-10-CM

## 2021-04-20 LAB — SARS-COV-2 N GENE NPH QL NAA+PROBE: NOT DETECTED

## 2021-04-22 ENCOUNTER — APPOINTMENT (OUTPATIENT)
Dept: PULMONOLOGY | Facility: CLINIC | Age: 36
End: 2021-04-22
Payer: COMMERCIAL

## 2021-04-22 VITALS
OXYGEN SATURATION: 99 % | WEIGHT: 234 LBS | TEMPERATURE: 97.2 F | HEIGHT: 66 IN | DIASTOLIC BLOOD PRESSURE: 76 MMHG | HEART RATE: 63 BPM | RESPIRATION RATE: 17 BRPM | BODY MASS INDEX: 37.61 KG/M2 | SYSTOLIC BLOOD PRESSURE: 122 MMHG

## 2021-04-22 PROCEDURE — 94010 BREATHING CAPACITY TEST: CPT

## 2021-04-22 PROCEDURE — 94727 GAS DIL/WSHOT DETER LNG VOL: CPT

## 2021-04-22 PROCEDURE — 99214 OFFICE O/P EST MOD 30 MIN: CPT | Mod: 25

## 2021-04-22 PROCEDURE — 94729 DIFFUSING CAPACITY: CPT

## 2021-04-22 PROCEDURE — ZZZZZ: CPT

## 2021-04-22 PROCEDURE — 99072 ADDL SUPL MATRL&STAF TM PHE: CPT

## 2021-04-22 NOTE — PROCEDURE
[FreeTextEntry1] : Full PFT revealed normal flows, with a FEV1 of 3.82  L, which is 125 % of predicted, normal lung volumes, and a diffusion of  30.4 , which is  104 % of predicted, with a normal flow volume loop

## 2021-04-22 NOTE — PHYSICAL EXAM
[General Appearance - Well Developed] : well developed [Normal Appearance] : normal appearance [Well Groomed] : well groomed [General Appearance - Well Nourished] : well nourished [No Deformities] : no deformities [General Appearance - In No Acute Distress] : no acute distress [Normal Conjunctiva] : the conjunctiva exhibited no abnormalities [Eyelids - No Xanthelasma] : the eyelids demonstrated no xanthelasmas [Normal Oropharynx] : normal oropharynx [III] : III [Neck Appearance] : the appearance of the neck was normal [Neck Cervical Mass (___cm)] : no neck mass was observed [Jugular Venous Distention Increased] : there was no jugular-venous distention [Thyroid Diffuse Enlargement] : the thyroid was not enlarged [Thyroid Nodule] : there were no palpable thyroid nodules [Heart Rate And Rhythm] : heart rate and rhythm were normal [Heart Sounds] : normal S1 and S2 [Murmurs] : no murmurs present [Respiration, Rhythm And Depth] : normal respiratory rhythm and effort [Exaggerated Use Of Accessory Muscles For Inspiration] : no accessory muscle use [Auscultation Breath Sounds / Voice Sounds] : lungs were clear to auscultation bilaterally [Abdomen Soft] : soft [Abdomen Tenderness] : non-tender [Abdomen Mass (___ Cm)] : no abdominal mass palpated [Abnormal Walk] : normal gait [Gait - Sufficient For Exercise Testing] : the gait was sufficient for exercise testing [Nail Clubbing] : no clubbing of the fingernails [Petechial Hemorrhages (___cm)] : no petechial hemorrhages [Cyanosis, Localized] : no localized cyanosis [Skin Color & Pigmentation] : normal skin color and pigmentation [] : no rash [No Venous Stasis] : no venous stasis [Skin Lesions] : no skin lesions [No Skin Ulcers] : no skin ulcer [No Xanthoma] : no  xanthoma was observed [Deep Tendon Reflexes (DTR)] : deep tendon reflexes were 2+ and symmetric [Sensation] : the sensory exam was normal to light touch and pinprick [No Focal Deficits] : no focal deficits [Oriented To Time, Place, And Person] : oriented to person, place, and time [Affect] : the affect was normal [Impaired Insight] : insight and judgment were intact [FreeTextEntry1] : reproducible L subclavicular pain

## 2021-04-22 NOTE — ASSESSMENT
[FreeTextEntry1] : Ms. Leroy is a a 36 year old female who has a hx of costochondritis, obesity, GERD who currently stable from a pulmonary perspective, stable SOB, improved (L chest, abdominal pain, chest pain) - otic symptoms. -still; losing weight. \par \par The patient's shortness of breath is multifactorial due to:\par -pulmonary disease \par      -cough, RADS, PND, GERD, Snore\par -poor breathing mechanics \par -overweight/out of shape\par -?cardiac disease \par \par Problem 1: Cough / RADS vs PND syndrome \par - continue Advair (250) 1 inhalation BID\par -Inhaler technique reviewed as well as oral hygiene techniques reviewed with patient. Avoidance of cold air, extremes of temperature, rescue inhaler should be used before exercise. Order of medication reviewed with patient. Recommended use of a cool mist humidifier in the bedroom.\par \par Problem 1a: PND syndrome \par -continue Qnasl 1 sniff BID \par Environmental measures for allergies were encouraged including mattress and pillow covers, air purifier, and environmental controls. \par \par Problem 2: L sided abdominal pain (resolved)-c/w MS\par -recommended Topricin cream\par -relatively resolved \par -c/w musculoskeletal pain (?costochondritis)\par -continue  Arthrotec PRN\par -can continue to use Arnica cream/Australian Dream\par -demonstrated some stretches for the pt\par -recommended to try massage therapy\par \par Problem 3: Obesity - improved\par -complete Functional Medicine Evaluation with Dr. Castillo \par - on weight watchers - lost 80+ lbs. \par -recommended diet\par -discussed mindful vs mindless eating\par -Weight loss, exercise, and diet control were discussed and are highly encouraged. Treatment options were given such as, aqua therapy, and contacting a nutritionist. Recommended to use the elliptical, stationary bike, less use of treadmill. Obesity is associated with worsening asthma, shortness of breath, and potential for cardiac disease, diabetes, and other underlying medical conditions. \par \par Problem 4:primary snoring\par -recommended to use positional sleep \par Good sleep hygiene was encouraged including wearing sunglasses 30 minutes before bed, avoiding watching television an hour before bed, keeping caffeine at a low, avoiding reading, television, or anything, in bed, no drinking any liquids three hours before bedtime, and only getting into bed when tired and ready for sleep. \par \par Problem 5: Reflux/Esophageal ulcer/GERD\par -continue OTC Rx PRN\par -diet control\par \par -discussed Rule of 2's; pt should avoid eating too much; too fast; too spicy; too lousy; less than two hours before bed\par -Things to avoid including overeating, spicy foods, tight clothing, eating within three hours of bed, this list is not all inclusive. \par -For treatment of reflux, possible options discussed including diet control, H2 blockers, PPIs, as well as coating motility agents discussed as treatment options. Timing of meals and proximity of last meal to sleep were discussed. If symptoms persist, a formal gastrointestinal evaluation is needed. \par \par Problem 6: Abnormal PFT's (flattened inspiratory limb)\par -have normalized and improved \par \par problem 7: poor breathing mechanics\par -Proper breathing techniques were reviewed with an emphasis of exhalation. Patient instructed to breath in for 1 second and out for four seconds. Patient was encouraged to not talk while walking. \par \par problem 8: out of shape / overweight\par -Weight loss, exercise, and diet control were discussed and are highly encouraged. Treatment options were given such as, aqua therapy, and contacting a nutritionist. Recommended to use the elliptical, stationary bike, less use of treadmill. Mindful eating was explained to the patient Obesity is associated with worsening asthma, shortness of breath, and potential for cardiac disease, diabetes, and other underlying medical conditions\par \par problem 9: health maintenance / COVID-19 precautions\par -s/p Pfizer COVID 19 vaccine x 1\par Immune Support Recommendations:\par -OTC Vitamin C 500mg BID \par -OTC Quercetin 250-500mg BID \par -OTC Zinc 75-100mg per day \par -OTC Melatonin 1 or 2 mg a night \par -OTC Vitamin D 1-4000mg per day \par -OTC Tonic Water 8oz per day\par -recommended yearly flu shot - 10/22/2020\par -recommended strep pneumonia vaccines: Prevnar-13 vaccine, followed by Pneumo vaccine 23 one year following\par -recommended early intervention for URIs\par -recommended regular osteoporosis evaluations\par -recommended early dermatological evaluations\par -recommended after the age of 50 to the age of 70, colonoscopy every 5 years \par \par F/U in x6 months \par She is encouraged to call with any changes, concerns, or questions. \par

## 2021-04-22 NOTE — ADDENDUM
[FreeTextEntry1] : Documented by Hever Nye acting as a scribe for Dr. Yg Barnes on 04/22/2021.\par \par All medical record entries made by the Scribe were at my, Dr. Yg Barnes's, direction and personally dictated by me on 04/22/2021 . I have reviewed the chart and agree that the record accurately reflects my personal performance of the history, physical exam, assessment and plan. I have also personally directed, reviewed, and agree with the discharge instructions. \par

## 2021-04-22 NOTE — HISTORY OF PRESENT ILLNESS
[FreeTextEntry1] : Ms. Leroy is a 36 year old female presenting to the office for a follow up visit for abnormal PFTs, GERD, snoring and chest pain. Her chief complaint is\par \par -she notes generally feeling well\par -she notes s/p Pfizer COVID 19 vaccine x 1\par -she notes energy levels high\par -she notes regular exercise daily\par -she denies visual issues\par -she notes intentional weight loss\par -she notes sleep quality stable, restful\par -she notes sleeping 6 to 7 hours a night on average\par -she denies dysphonia\par -she notes mild RAMIREZ controlled\par -she notes abdominal pain quiet\par \par -denies any chest pain, chest pressure, diarrhea, constipation, dysphagia, sour taste in the mouth, dizziness, leg swelling, leg pain, myalgias, arthralgias, itchy eyes, itchy ears, heartburn, or reflux.\par \par

## 2021-06-21 ENCOUNTER — RESULT REVIEW (OUTPATIENT)
Age: 36
End: 2021-06-21

## 2021-07-07 ENCOUNTER — RESULT REVIEW (OUTPATIENT)
Age: 36
End: 2021-07-07

## 2021-10-21 ENCOUNTER — APPOINTMENT (OUTPATIENT)
Dept: PULMONOLOGY | Facility: CLINIC | Age: 36
End: 2021-10-21
Payer: COMMERCIAL

## 2021-10-21 VITALS
SYSTOLIC BLOOD PRESSURE: 122 MMHG | WEIGHT: 250 LBS | HEART RATE: 66 BPM | BODY MASS INDEX: 40.18 KG/M2 | HEIGHT: 66 IN | TEMPERATURE: 97.1 F | DIASTOLIC BLOOD PRESSURE: 70 MMHG | RESPIRATION RATE: 17 BRPM | OXYGEN SATURATION: 99 %

## 2021-10-21 DIAGNOSIS — R07.89 OTHER CHEST PAIN: ICD-10-CM

## 2021-10-21 DIAGNOSIS — Z23 ENCOUNTER FOR IMMUNIZATION: ICD-10-CM

## 2021-10-21 DIAGNOSIS — Z71.89 OTHER SPECIFIED COUNSELING: ICD-10-CM

## 2021-10-21 DIAGNOSIS — M94.0 CHONDROCOSTAL JUNCTION SYNDROME [TIETZE]: ICD-10-CM

## 2021-10-21 PROCEDURE — G0008: CPT

## 2021-10-21 PROCEDURE — 90682 RIV4 VACC RECOMBINANT DNA IM: CPT

## 2021-10-21 PROCEDURE — 94618 PULMONARY STRESS TESTING: CPT

## 2021-10-21 PROCEDURE — 99214 OFFICE O/P EST MOD 30 MIN: CPT | Mod: 25

## 2021-10-21 NOTE — PROCEDURE
[FreeTextEntry1] : 6 minute walk test reveals a low saturation of 98% with no evidence of dyspnea or fatigue; walked 570.5  meters\par \par

## 2021-10-21 NOTE — HISTORY OF PRESENT ILLNESS
[FreeTextEntry1] : Ms. Leroy is a 36 year old female presenting to the office for a follow up visit for abnormal PFTs, GERD, snoring and chest pain. Her chief complaint is\par - she has been feeling well \par - overall energy is good \par - she notes she has been exercising, has a peloton bike. She uses it 5 days a week half an hour consistently. No limitations. \par - she notes she has been eating out more \par - no heart burn / reflux \par - her pain has dissipated \par - no itchy eyes / ears \par - she notes her ears always a little sensitive \par - she notes her sleep is good, she feels she could use more. She gets about 6 hours of uninterrupted sleep. \par - she notes her left shoulder has been bothering her. \par - she notes she had a colonoscopy last week and had internal hemorrhoids. Her doctor gave her a suppository which helped clear everything up. \par - she notes her heart rate has been at a low state. But when she went to the gastrologist. \par - She  denies any visual issues, headaches, nausea, vomiting, fever, chills, sweats, chest pains, chest pressure, diarrhea, constipation, dysphagia, myalgia, dizziness, leg swelling, leg pain, itchy eyes, itchy ears, heartburn, reflux, or sour taste in the mouth.\par

## 2021-10-21 NOTE — ADDENDUM
[FreeTextEntry1] : Documented by Della Mcdowell acting as a scribe for Dr. Yg Barnes on 10/21/2021 \par \par All medical record entries made by the Scribe were at my, Dr. Yg Barnes's, direction and personally dictated by me on 10/21/2021 . I have reviewed the chart and agree that the record accurately reflects my personal performance of the history, physical exam, assessment and plan. I have also personally directed, reviewed, and agree with the discharge instructions.

## 2021-10-21 NOTE — PHYSICAL EXAM
[General Appearance - Well Developed] : well developed [Normal Appearance] : normal appearance [Well Groomed] : well groomed [General Appearance - Well Nourished] : well nourished [General Appearance - In No Acute Distress] : no acute distress [No Deformities] : no deformities [Normal Conjunctiva] : the conjunctiva exhibited no abnormalities [Eyelids - No Xanthelasma] : the eyelids demonstrated no xanthelasmas [Normal Oropharynx] : normal oropharynx [Neck Appearance] : the appearance of the neck was normal [Neck Cervical Mass (___cm)] : no neck mass was observed [Jugular Venous Distention Increased] : there was no jugular-venous distention [Thyroid Diffuse Enlargement] : the thyroid was not enlarged [Thyroid Nodule] : there were no palpable thyroid nodules [Heart Rate And Rhythm] : heart rate and rhythm were normal [Heart Sounds] : normal S1 and S2 [Murmurs] : no murmurs present [Respiration, Rhythm And Depth] : normal respiratory rhythm and effort [Exaggerated Use Of Accessory Muscles For Inspiration] : no accessory muscle use [Auscultation Breath Sounds / Voice Sounds] : lungs were clear to auscultation bilaterally [Abdomen Soft] : soft [Abdomen Tenderness] : non-tender [Abdomen Mass (___ Cm)] : no abdominal mass palpated [Abnormal Walk] : normal gait [Gait - Sufficient For Exercise Testing] : the gait was sufficient for exercise testing [Nail Clubbing] : no clubbing of the fingernails [Cyanosis, Localized] : no localized cyanosis [Petechial Hemorrhages (___cm)] : no petechial hemorrhages [Skin Color & Pigmentation] : normal skin color and pigmentation [] : no rash [No Venous Stasis] : no venous stasis [Skin Lesions] : no skin lesions [No Skin Ulcers] : no skin ulcer [No Xanthoma] : no  xanthoma was observed [Deep Tendon Reflexes (DTR)] : deep tendon reflexes were 2+ and symmetric [Sensation] : the sensory exam was normal to light touch and pinprick [No Focal Deficits] : no focal deficits [Oriented To Time, Place, And Person] : oriented to person, place, and time [Impaired Insight] : insight and judgment were intact [Affect] : the affect was normal [II] : II [FreeTextEntry1] : reproducible L subclavicular pain

## 2021-10-21 NOTE — ASSESSMENT
[FreeTextEntry1] : Ms. Leroy is a a 36 year old female who has a hx of costochondritis, obesity, GERD who currently stable from a pulmonary perspective, stable SOB, improved (L chest, abdominal pain, chest pain) - otic symptoms frequent. -still; losing weight; recent internal hemorrhoid / bradycardia \par \par The patient's shortness of breath is multifactorial due to:\par -pulmonary disease \par      -cough, RADS, PND, GERD, Snore\par -poor breathing mechanics \par -overweight/out of shape\par -?cardiac disease \par \par Problem 1: Cough / RADS vs PND syndrome \par - continue Advair (250) 1 inhalation BID\par -Inhaler technique reviewed as well as oral hygiene techniques reviewed with patient. Avoidance of cold air, extremes of temperature, rescue inhaler should be used before exercise. Order of medication reviewed with patient. Recommended use of a cool mist humidifier in the bedroom.\par \par Problem 1a: PND syndrome \par -continue Qnasl 1 sniff BID \par Environmental measures for allergies were encouraged including mattress and pillow covers, air purifier, and environmental controls. \par \par Problem 2: L sided abdominal pain (resolved)-c/w MS\par -recommended Topricin cream\par -relatively resolved \par -c/w musculoskeletal pain (?costochondritis)\par -continue  Arthrotec PRN\par -can continue to use Arnica cream/Australian Dream\par -demonstrated some stretches for the pt\par -recommended to try massage therapy\par \par Problem 3: Obesity - \par - recommended MUNIQ \par - Anna / FRANKLIN Quintanilla \par - on weight watchers - lost 80+ lbs. \par -recommended diet - MUNIQ \par -discussed mindful vs mindless eating\par -Weight loss, exercise, and diet control were discussed and are highly encouraged. Treatment options were given such as, aqua therapy, and contacting a nutritionist. Recommended to use the elliptical, stationary bike, less use of treadmill. Obesity is associated with worsening asthma, shortness of breath, and potential for cardiac disease, diabetes, and other underlying medical conditions. \par \par Problem 4:primary snoring\par -recommended to use positional sleep \par Good sleep hygiene was encouraged including wearing sunglasses 30 minutes before bed, avoiding watching television an hour before bed, keeping caffeine at a low, avoiding reading, television, or anything, in bed, no drinking any liquids three hours before bedtime, and only getting into bed when tired and ready for sleep. \par \par Problem 5: Reflux/Esophageal ulcer/GERD\par -continue OTC Rx PRN\par -diet control\par \par -discussed Rule of 2's; pt should avoid eating too much; too fast; too spicy; too lousy; less than two hours before bed\par -Things to avoid including overeating, spicy foods, tight clothing, eating within three hours of bed, this list is not all inclusive. \par -For treatment of reflux, possible options discussed including diet control, H2 blockers, PPIs, as well as coating motility agents discussed as treatment options. Timing of meals and proximity of last meal to sleep were discussed. If symptoms persist, a formal gastrointestinal evaluation is needed. \par \par Problem 6: Abnormal PFT's (flattened inspiratory limb)\par -have normalized and improved \par \par problem 7: poor breathing mechanics\par -Proper breathing techniques were reviewed with an emphasis of exhalation. Patient instructed to breath in for 1 second and out for four seconds. Patient was encouraged to not talk while walking. \par \par problem 8: out of shape / overweight\par -Weight loss, exercise, and diet control were discussed and are highly encouraged. Treatment options were given such as, aqua therapy, and contacting a nutritionist. Recommended to use the elliptical, stationary bike, less use of treadmill. Mindful eating was explained to the patient Obesity is associated with worsening asthma, shortness of breath, and potential for cardiac disease, diabetes, and other underlying medical conditions\par \par problem 9: health maintenance / COVID-19 precautions\par -s/p Pfizer COVID 19 vaccine x 2\par Immune Support Recommendations:\par -OTC Vitamin C 500mg BID \par -OTC Quercetin 250-500mg BID \par -OTC Zinc 75-100mg per day \par -OTC Melatonin 1 or 2 mg a night \par -OTC Vitamin D 1-4000mg per day \par -OTC Tonic Water 8oz per day\par -recommended yearly flu shot - 10/2021 \par -recommended strep pneumonia vaccines: Prevnar-13 vaccine, followed by Pneumo vaccine 23 one year following\par -recommended early intervention for URIs\par -recommended regular osteoporosis evaluations\par -recommended early dermatological evaluations\par -recommended after the age of 50 to the age of 70, colonoscopy every 5 years \par \par F/U in x6 months \par She is encouraged to call with any changes, concerns, or questions. \par

## 2021-11-23 ENCOUNTER — TRANSCRIPTION ENCOUNTER (OUTPATIENT)
Age: 36
End: 2021-11-23

## 2021-12-19 ENCOUNTER — RESULT REVIEW (OUTPATIENT)
Age: 36
End: 2021-12-19

## 2022-06-22 ENCOUNTER — APPOINTMENT (OUTPATIENT)
Dept: PULMONOLOGY | Facility: CLINIC | Age: 37
End: 2022-06-22
Payer: COMMERCIAL

## 2022-06-22 PROCEDURE — 99213 OFFICE O/P EST LOW 20 MIN: CPT | Mod: 95

## 2022-06-22 RX ORDER — BECLOMETHASONE DIPROPIONATE 80 UG/1
80 AEROSOL, METERED NASAL
Qty: 3 | Refills: 1 | Status: DISCONTINUED | COMMUNITY
Start: 2020-10-22 | End: 2022-06-22

## 2022-06-22 RX ORDER — FLUTICASONE PROPIONATE AND SALMETEROL 250; 50 UG/1; UG/1
250-50 POWDER RESPIRATORY (INHALATION)
Qty: 1 | Refills: 3 | Status: DISCONTINUED | COMMUNITY
Start: 2020-04-23 | End: 2022-06-22

## 2022-06-22 NOTE — PHYSICAL EXAM
[No Acute Distress] : no acute distress [Normal Appearance] : normal appearance [Normal Color/ Pigmentation] : normal color/ pigmentation [No Focal Deficits] : no focal deficits [Oriented x3] : oriented x3 [Normal Affect] : normal affect

## 2022-06-22 NOTE — ASSESSMENT
[FreeTextEntry1] : Ms. Leroy is a 37 year old female with history of obesity, RAD, GERD, snoring who presents via video for COVID infection. \par \par 1. COVID\par -add Paxlovid BID x 5 days\par -advised nba seltzer cold & flu\par -advised Throat Coat Tea\par -advised Fisherman's lozenges\par \par 2.RADs\par -continue Advair (250) 1 inhalation BID\par \par Patient to follow up with Dr. Barnes as scheduled.\par Patient to call with further questions and concerns.\par Patient verbalizes understanding of care plan and is agreeable.\par \par \par

## 2022-06-22 NOTE — HISTORY OF PRESENT ILLNESS
[Never] : never [TextBox_4] : Ms. Leroy is a 37 year old female with history of obesity, RAD, GERD, snoring who presents via video for COVID infection. \par \par Her chief concern is COVID infection. \par \par Patient reports she tested positive for COVID via home test yesterday. She reports low grade fever, dry cough, sore throat, body aches, HA, decreased appetite, chills X 2 days. Patient reports she taking Tylenol for symptoms. She reports she is not taking any inhalers, OTC antihistamines or statins. \par \par Patient denies SOB @ rest or exertion, wheezing, nasal congestion, runny nose, sneezing, diarrhea, heartburn/reflux. \par \par Patient reports she is COVID vaccinated and boosted. \par \par \par

## 2022-06-22 NOTE — REVIEW OF SYSTEMS
[Fatigue] : fatigue [Chills] : chills [Poor Appetite] : poor appetite [Sore Throat] : sore throat [Cough] : cough [Negative] : Psychiatric [Fever] : no fever [Dry Eyes] : no dry eyes [Ear Disturbance] : no ear disturbance [Epistaxis] : no epistaxis [Eye Irritation] : no eye irritation [Nasal Congestion] : no nasal congestion [Postnasal Drip] : no postnasal drip [Dry Mouth] : no dry mouth [Sinus Problems] : no sinus problems [Mouth Ulcers] : no mouth ulcers [Poor Dentition] : no poor dentition [Edentulous] : no edentulous [Hemoptysis] : no hemoptysis [Chest Tightness] : no chest tightness [Frequent URIs] : no frequent URIs [Sputum] : no sputum [Dyspnea] : no dyspnea [Pleuritic Pain] : no pleuritic pain [Wheezing] : no wheezing [A.M. Dry Mouth] : no a.m. dry mouth [SOB on Exertion] : no sob on exertion

## 2022-06-22 NOTE — REASON FOR VISIT
[Home] : at home, [unfilled] , at the time of the visit. [Medical Office: ([facility])___] : at the medical office located in  [Patient] : the patient [Follow-Up] : a follow-up visit [Asthma] : asthma [Cough] : cough

## 2023-05-23 ENCOUNTER — TRANSCRIPTION ENCOUNTER (OUTPATIENT)
Age: 38
End: 2023-05-23

## 2023-05-24 ENCOUNTER — APPOINTMENT (OUTPATIENT)
Dept: PULMONOLOGY | Facility: CLINIC | Age: 38
End: 2023-05-24
Payer: COMMERCIAL

## 2023-05-24 VITALS
HEIGHT: 66 IN | RESPIRATION RATE: 16 BRPM | TEMPERATURE: 97.8 F | DIASTOLIC BLOOD PRESSURE: 80 MMHG | WEIGHT: 293 LBS | HEART RATE: 66 BPM | BODY MASS INDEX: 47.09 KG/M2 | SYSTOLIC BLOOD PRESSURE: 116 MMHG | OXYGEN SATURATION: 98 %

## 2023-05-24 DIAGNOSIS — J45.909 UNSPECIFIED ASTHMA, UNCOMPLICATED: ICD-10-CM

## 2023-05-24 DIAGNOSIS — U07.1 COVID-19: ICD-10-CM

## 2023-05-24 DIAGNOSIS — K21.9 GASTRO-ESOPHAGEAL REFLUX DISEASE W/OUT ESOPHAGITIS: ICD-10-CM

## 2023-05-24 DIAGNOSIS — R94.2 ABNORMAL RESULTS OF PULMONARY FUNCTION STUDIES: ICD-10-CM

## 2023-05-24 DIAGNOSIS — R09.82 POSTNASAL DRIP: ICD-10-CM

## 2023-05-24 DIAGNOSIS — R06.02 SHORTNESS OF BREATH: ICD-10-CM

## 2023-05-24 DIAGNOSIS — R06.83 SNORING: ICD-10-CM

## 2023-05-24 PROCEDURE — 99214 OFFICE O/P EST MOD 30 MIN: CPT | Mod: 25

## 2023-05-24 PROCEDURE — 94729 DIFFUSING CAPACITY: CPT

## 2023-05-24 PROCEDURE — 94010 BREATHING CAPACITY TEST: CPT

## 2023-05-24 PROCEDURE — 94727 GAS DIL/WSHOT DETER LNG VOL: CPT

## 2023-05-24 PROCEDURE — 95012 NITRIC OXIDE EXP GAS DETER: CPT

## 2023-05-24 PROCEDURE — 71046 X-RAY EXAM CHEST 2 VIEWS: CPT

## 2023-05-24 RX ORDER — NIRMATRELVIR AND RITONAVIR 300-100 MG
20 X 150 MG & KIT ORAL
Qty: 1 | Refills: 0 | Status: DISCONTINUED | COMMUNITY
Start: 2022-06-22 | End: 2023-05-24

## 2023-05-24 RX ORDER — FLUTICASONE PROPIONATE AND SALMETEROL 250; 50 UG/1; UG/1
250-50 POWDER RESPIRATORY (INHALATION)
Qty: 1 | Refills: 1 | Status: ACTIVE | COMMUNITY
Start: 2023-05-24 | End: 1900-01-01

## 2023-05-24 RX ORDER — PREDNISONE 10 MG/1
10 TABLET ORAL
Qty: 50 | Refills: 0 | Status: ACTIVE | COMMUNITY
Start: 2023-05-24 | End: 1900-01-01

## 2023-05-24 NOTE — PROCEDURE
[FreeTextEntry1] : Full PFT reveals normal flows; FEV1 was 4.18 L which is % of predicted; normal lung volumes; normal diffusion at 26.0, which is 77% of predicted; normal flow volume loop.\par PFTs were performed to evaluate for asthma and SOB\par \par FENO was 15; a normal value being less than 25\par Fractional exhaled nitric oxide (FENO) is regarded as a simple, noninvasive method for assessing eosinophilic airway inflammation. Produced by a variety of cells within the lung, nitric oxide (NO) concentrations are generally low in healthy individuals. However, high concentrations of NO appear to be involved in nonspecific host defense mechanisms and chronic inflammatory diseases such as asthma. The American Thoracic Society (ATS) therefore has recommended using FENO to aid in the diagnosis and monitoring of eosinophilic airway inflammation and asthma, and for identifying steroid responsive individuals whose chronic respiratory symptoms may be caused by airway inflammation.

## 2023-05-24 NOTE — ADDENDUM
[FreeTextEntry1] : Documented by Saige Asher acting as a scribe for Dr. Yg Barnes on 05/24/2023 .\par \par All medical record entries made by the Scribe were at my, Dr. Yg Barnes's, direction and personally dictated by me on 05/24/2023. I have reviewed the chart and agree that the record accurately reflects my personal performance of the history, physical exam, assessment and plan. I have also personally directed, reviewed, and agree with the discharge instructions.

## 2023-05-24 NOTE — ASSESSMENT
[FreeTextEntry1] : Ms. Leroy is a a 38 year old female who has a hx of costochondritis, obesity, GERD who currently stable from a pulmonary perspective, stable SOB, improved (L chest, abdominal pain, chest pain) - otic symptoms frequent. - now with post URI bronchospasms \par \par The patient's shortness of breath is multifactorial due to:\par -pulmonary disease \par      -cough, RADS, PND, GERD, Snore\par -poor breathing mechanics \par -overweight/out of shape\par -?cardiac disease \par \par Problem 1: Cough / RADS (post URI bronchospasms ) \par -add Prednisone 20 mg x 7 days, 10 mg x 7 days \par -Information sheet given to the patient to be reviewed, this medication is never to be used without consulting the prescribing physician. Proper dietary restraint is necessary specifically salt containing foods, if any reaction may occur should be reported. \par \par - continue Advair (250) 1 inhalation BID\par -Inhaler technique reviewed as well as oral hygiene techniques reviewed with patient. Avoidance of cold air, extremes of temperature, rescue inhaler should be used before exercise. Order of medication reviewed with patient. Recommended use of a cool mist humidifier in the bedroom.\par \par Problem 1a: PND syndrome- quiet \par -continue Qnasl 1 sniff BID \par Environmental measures for allergies were encouraged including mattress and pillow covers, air purifier, and environmental controls. \par \par Problem 2: L sided abdominal pain (resolved)-c/w MS\par -recommended Topricin cream\par -relatively resolved \par -c/w musculoskeletal pain (?costochondritis)\par -continue  Arthrotec PRN\par -can continue to use Arnica cream/Australian Dream\par -demonstrated some stretches for the pt\par -recommended to try massage therapy\par \par Problem 3: Obesity - \par - recommended MUNIQ \par - Anna / FRANKLIN Quintanilla (pending) \par - on weight watchers - lost 80+ lbs. \par -recommended diet - MUNIQ \par -discussed mindful vs mindless eating\par -Weight loss, exercise, and diet control were discussed and are highly encouraged. Treatment options were given such as, aqua therapy, and contacting a nutritionist. Recommended to use the elliptical, stationary bike, less use of treadmill. Obesity is associated with worsening asthma, shortness of breath, and potential for cardiac disease, diabetes, and other underlying medical conditions. \par \par Problem 4:primary snoring\par -recommended to use positional sleep \par Good sleep hygiene was encouraged including wearing sunglasses 30 minutes before bed, avoiding watching television an hour before bed, keeping caffeine at a low, avoiding reading, television, or anything, in bed, no drinking any liquids three hours before bedtime, and only getting into bed when tired and ready for sleep. \par \par Problem 5: Reflux/Esophageal ulcer/GERD\par -continue OTC Rx PRN\par -diet control\par \par -discussed Rule of 2's; pt should avoid eating too much; too fast; too spicy; too lousy; less than two hours before bed\par -Things to avoid including overeating, spicy foods, tight clothing, eating within three hours of bed, this list is not all inclusive. \par -For treatment of reflux, possible options discussed including diet control, H2 blockers, PPIs, as well as coating motility agents discussed as treatment options. Timing of meals and proximity of last meal to sleep were discussed. If symptoms persist, a formal gastrointestinal evaluation is needed. \par \par Problem 6: Abnormal PFT's (flattened inspiratory limb)\par -have normalized and improved \par \par problem 7: poor breathing mechanics\par -Proper breathing techniques were reviewed with an emphasis of exhalation. Patient instructed to breath in for 1 second and out for four seconds. Patient was encouraged to not talk while walking. \par \par problem 8: out of shape / overweight\par -Weight loss, exercise, and diet control were discussed and are highly encouraged. Treatment options were given such as, aqua therapy, and contacting a nutritionist. Recommended to use the elliptical, stationary bike, less use of treadmill. Mindful eating was explained to the patient Obesity is associated with worsening asthma, shortness of breath, and potential for cardiac disease, diabetes, and other underlying medical conditions\par \par problem 9: health maintenance / COVID-19 precautions\par -s/p Pfizer COVID 19 vaccine x 2\par Immune Support Recommendations:\par -OTC Vitamin C 500mg BID \par -OTC Quercetin 250-500mg BID \par -OTC Zinc 75-100mg per day \par -OTC Melatonin 1 or 2 mg a night \par -OTC Vitamin D 1-4000mg per day \par -OTC Tonic Water 8oz per day\par -recommended yearly flu shot - 10/2021 \par -recommended strep pneumonia vaccines: Prevnar-13 vaccine, followed by Pneumo vaccine 23 one year following\par -recommended early intervention for URIs\par -recommended regular osteoporosis evaluations\par -recommended early dermatological evaluations\par -recommended after the age of 50 to the age of 70, colonoscopy every 5 years \par \par F/U in x6 months \par She is encouraged to call with any changes, concerns, or questions. \par

## 2023-05-24 NOTE — HISTORY OF PRESENT ILLNESS
[FreeTextEntry1] : Ms. Leroy is a 38  year old female presenting to the office for a follow up visit for abnormal PFTs, GERD, snoring and chest pain. Her chief complaint is\par \par -she notes URI 2 months ago \par -she notes going to urgent care for ear infection and dysphagia \par -she notes diagnosed with strep throat and prescribed antibiotics 2 weeks ago \par -she notes infection resolved but now has residual cough \par -she notes cough is mostly unproductive \par -she notes SOB is exacerbated by coughing \par -she notes coughing is exacerbated by talking \par -she notes chest pressure \par -she notes good quality of sleep \par -she notes bowels are regular \par -she notes her senses of smell and taste are stable \par -she denies current sinus issues and PND \par -she notes intermittent dysphonia \par -she notes weight gain after retuning to work and poor diet \par -she notes intermittent reflux that is resolved with Tums \par \par -she denies any headaches, nausea, emesis, fever, chills, sweats, chest pain, chest pressure, coughing, wheezing, palpitations, constipation, diarrhea, vertigo, dysphagia, heartburn, reflux, itchy eyes, itchy ears, leg swelling, arthralgias, myalgias, or sour taste in the mouth.

## 2023-08-15 ENCOUNTER — APPOINTMENT (OUTPATIENT)
Dept: PULMONOLOGY | Facility: CLINIC | Age: 38
End: 2023-08-15

## 2023-09-13 ENCOUNTER — APPOINTMENT (OUTPATIENT)
Dept: BARIATRICS/WEIGHT MGMT | Facility: CLINIC | Age: 38
End: 2023-09-13
Payer: COMMERCIAL

## 2023-09-13 ENCOUNTER — OUTPATIENT (OUTPATIENT)
Dept: OUTPATIENT SERVICES | Facility: HOSPITAL | Age: 38
LOS: 1 days | End: 2023-09-13
Payer: COMMERCIAL

## 2023-09-13 DIAGNOSIS — I10 ESSENTIAL (PRIMARY) HYPERTENSION: ICD-10-CM

## 2023-09-13 PROCEDURE — 99204 OFFICE O/P NEW MOD 45 MIN: CPT | Mod: 95

## 2023-09-13 PROCEDURE — G0463: CPT

## 2023-09-15 DIAGNOSIS — E66.9 OBESITY, UNSPECIFIED: ICD-10-CM

## 2023-09-15 LAB
25(OH)D3 SERPL-MCNC: 22 NG/ML
ALBUMIN SERPL ELPH-MCNC: 4 G/DL
ALP BLD-CCNC: 78 U/L
ALT SERPL-CCNC: 15 U/L
ANION GAP SERPL CALC-SCNC: 14 MMOL/L
AST SERPL-CCNC: 15 U/L
BILIRUB SERPL-MCNC: 0.4 MG/DL
BUN SERPL-MCNC: 13 MG/DL
CALCIUM SERPL-MCNC: 9 MG/DL
CHLORIDE SERPL-SCNC: 103 MMOL/L
CHOLEST SERPL-MCNC: 174 MG/DL
CO2 SERPL-SCNC: 24 MMOL/L
CREAT SERPL-MCNC: 0.76 MG/DL
CRP SERPL HS-MCNC: 11.39 MG/L
EGFR: 102 ML/MIN/1.73M2
ESTIMATED AVERAGE GLUCOSE: 111 MG/DL
GLUCOSE SERPL-MCNC: 79 MG/DL
HBA1C MFR BLD HPLC: 5.5 %
HDLC SERPL-MCNC: 56 MG/DL
INSULIN SERPL-MCNC: 21.1 UU/ML
LDLC SERPL CALC-MCNC: 103 MG/DL
NONHDLC SERPL-MCNC: 118 MG/DL
POTASSIUM SERPL-SCNC: 4.4 MMOL/L
PROT SERPL-MCNC: 6.8 G/DL
SODIUM SERPL-SCNC: 140 MMOL/L
T3FREE SERPL-MCNC: 3.35 PG/ML
T4 FREE SERPL-MCNC: 1.1 NG/DL
TRIGL SERPL-MCNC: 81 MG/DL
TSH SERPL-ACNC: 2.56 UIU/ML

## 2023-10-25 ENCOUNTER — APPOINTMENT (OUTPATIENT)
Dept: BARIATRICS/WEIGHT MGMT | Facility: CLINIC | Age: 38
End: 2023-10-25
Payer: COMMERCIAL

## 2023-10-25 ENCOUNTER — OUTPATIENT (OUTPATIENT)
Dept: OUTPATIENT SERVICES | Facility: HOSPITAL | Age: 38
LOS: 1 days | End: 2023-10-25
Payer: COMMERCIAL

## 2023-10-25 DIAGNOSIS — E66.9 OBESITY, UNSPECIFIED: ICD-10-CM

## 2023-10-25 PROCEDURE — 99213 OFFICE O/P EST LOW 20 MIN: CPT | Mod: 95

## 2023-10-25 PROCEDURE — G0463: CPT

## 2023-10-26 DIAGNOSIS — I10 ESSENTIAL (PRIMARY) HYPERTENSION: ICD-10-CM

## 2023-11-30 ENCOUNTER — APPOINTMENT (OUTPATIENT)
Dept: BARIATRICS/WEIGHT MGMT | Facility: CLINIC | Age: 38
End: 2023-11-30
Payer: COMMERCIAL

## 2023-11-30 PROCEDURE — 99212 OFFICE O/P EST SF 10 MIN: CPT | Mod: 95

## 2024-05-14 ENCOUNTER — APPOINTMENT (OUTPATIENT)
Dept: ORTHOPEDIC SURGERY | Facility: CLINIC | Age: 39
End: 2024-05-14
Payer: COMMERCIAL

## 2024-05-14 VITALS
WEIGHT: 293 LBS | DIASTOLIC BLOOD PRESSURE: 84 MMHG | SYSTOLIC BLOOD PRESSURE: 134 MMHG | HEART RATE: 73 BPM | HEIGHT: 66 IN | BODY MASS INDEX: 47.09 KG/M2

## 2024-05-14 DIAGNOSIS — Z77.22 CONTACT WITH AND (SUSPECTED) EXPOSURE TO ENVIRONMENTAL TOBACCO SMOKE (ACUTE) (CHRONIC): ICD-10-CM

## 2024-05-14 DIAGNOSIS — M25.569 PAIN IN UNSPECIFIED KNEE: ICD-10-CM

## 2024-05-14 PROCEDURE — 73564 X-RAY EXAM KNEE 4 OR MORE: CPT | Mod: LT

## 2024-05-14 PROCEDURE — 99203 OFFICE O/P NEW LOW 30 MIN: CPT

## 2024-05-14 RX ORDER — MELOXICAM 15 MG/1
15 TABLET ORAL DAILY
Qty: 10 | Refills: 1 | Status: ACTIVE | COMMUNITY
Start: 2024-05-14 | End: 1900-01-01

## 2024-05-14 NOTE — DISCUSSION/SUMMARY
[de-identified] : Left knee lateral meniscus tear.  Discussed with her that her history, exam, imaging findings are consistent with a meniscus tear.  We discussed that since She he does have restricted range of motion and significant difficulty with ambulating, I recommend an MRI to evaluate the meniscus further.  We discussed that if She does have a significant meniscus tear such as a bucket-handle or a root tear, She may require surgery rather than nonoperative treatment in order to stabilize the meniscus and allow her to achieve full range of motion.  In the absence of these types of meniscus injuries, She  may be a candidate for nonoperative treatment including physical therapy and anti-inflammatories.  I will prescribe her meloxicam 15 mg daily to be taken with food for the next 10 days to help her with the pain. I discussed with them that they should not take this while also taking Aleve (Naprosyn), Motrin/ Advil (Ibuprofen), Toradol (ketoralac). They must stop taking it if they develops stomach pain, increased bleeding or bruising and they should follow-up with their primary care doctor for routine blood work including kidney function to monitor its effect. While it Is not a habit-forming substance, it should only be taken as needed and to discontinue use once symptoms have resolved.  She will follow-up after the MRI is complete.  I have personally obtained the history, reviewed the ROS as noted, and performed the physical examination today. The patient and I discussed the assessment and options and developed the plan. All questions were answered and the patient stated their understanding of the treatment plan and appreciation of the visit.  My cumulative time spent on this patient's visit included: Preparation for the visit, review of the medical records, review of pertinent diagnostic studies, examination and counseling of the patient on the above diagnosis, treatment plan and prognosis, orders of diagnostic tests, medications and/or appropriate procedures and documentation in the medical records of today's visit.  This note was generated using dragon medical dictation software.  A reasonable effort has been made for proofreading its contents, but typos may still remain.  If there are any questions or points of clarification needed please notify my office.
Toe Gangrene

## 2024-05-14 NOTE — HISTORY OF PRESENT ILLNESS
[de-identified] : RACHAEL WATERS  is a 39 year old F who presents with left knee pain.  She says that yesterday she was walking [place] steps when she felt her left knee give way and buckle.  After this, she began feeling pain on the posterior aspect of her left knee.  She was able to continue up the steps but then had to sit down.  After this, she had significant pain throughout the rest of the day.  She again localizes the pain to the posterior and lateral aspects of the knee.  She says it can be as bad as an 8 out of 10.  She has been taking Advil and Tylenol with some relief of the pain.  She does report some previous knee pain but says that has never been this bad.  She is also going on a trip next week so she would like to this to be improved prior to her trip.

## 2024-05-14 NOTE — PHYSICAL EXAM
[de-identified] : General: No apparent distress Cardiovascular: extremities warm and well-perfused, no cyanosis Pulmonary: non labored respirations    Left hip: No pain with hip range of motion  Left knee: Skin is intact, warm, and dry Motor and sensory intact, lower extremity warm and well-perfused  Disuse atrophy: None Effusion: Mild  Active ROM: 0 to 110 degrees of flexion, pain with deep flexion Passive ROM: Same as active Crepitation: None  Joint Line tenderness: Lateral joint line tenderness to palpation Patellar facet tenderness: None Patellar tracking: Midline Patellar mobility/apprehension: Normal/none  Ligamentous Exam: Negative anterior drawer, negative posterior drawer, stable to varus and valgus stress at 0 and 30 degrees of flexion  Alignment: Neutral   Right hip: No pain with hip range of motion  Right knee:  Skin is intact, warm, and dry Motor and sensory intact, lower extremity warm and well-perfused  Disuse atrophy: None Effusion: None  Active ROM: 0 to 130 degrees of flexion Passive ROM: Same as active Crepitation: None  Joint Line tenderness: None Patellar facet tenderness: None Patellar tracking: Midline Patellar mobility/apprehension: Normal/none  Ligamentous Exam: Negative anterior drawer, negative posterior drawer, stable to varus and valgus stress at 0 and 30 degrees of flexion  Alignment: Neutral [de-identified] : 4 views of the left knee obtained today and interpreted by me including AP, flexion AP, lateral, sunrise views demonstrate no acute fracture or dislocation.  There are mild degenerative changes about the patellofemoral compartment  Images reviewed in detail with the patient

## 2024-05-16 ENCOUNTER — OUTPATIENT (OUTPATIENT)
Dept: OUTPATIENT SERVICES | Facility: HOSPITAL | Age: 39
LOS: 1 days | End: 2024-05-16
Payer: COMMERCIAL

## 2024-05-16 ENCOUNTER — APPOINTMENT (OUTPATIENT)
Dept: MRI IMAGING | Facility: CLINIC | Age: 39
End: 2024-05-16
Payer: COMMERCIAL

## 2024-05-16 DIAGNOSIS — Z00.8 ENCOUNTER FOR OTHER GENERAL EXAMINATION: ICD-10-CM

## 2024-05-16 PROCEDURE — 73721 MRI JNT OF LWR EXTRE W/O DYE: CPT | Mod: 26,LT

## 2024-05-16 PROCEDURE — 73721 MRI JNT OF LWR EXTRE W/O DYE: CPT

## 2024-05-17 ENCOUNTER — APPOINTMENT (OUTPATIENT)
Dept: ORTHOPEDIC SURGERY | Facility: CLINIC | Age: 39
End: 2024-05-17
Payer: COMMERCIAL

## 2024-05-17 VITALS — SYSTOLIC BLOOD PRESSURE: 135 MMHG | DIASTOLIC BLOOD PRESSURE: 92 MMHG | HEART RATE: 56 BPM

## 2024-05-17 PROCEDURE — 99213 OFFICE O/P EST LOW 20 MIN: CPT

## 2024-05-17 RX ORDER — MELOXICAM 15 MG/1
15 TABLET ORAL
Qty: 21 | Refills: 0 | Status: ACTIVE | COMMUNITY
Start: 2024-05-17 | End: 1900-01-01

## 2024-05-17 NOTE — PHYSICAL EXAM
[de-identified] : General: No apparent distress Cardiovascular: extremities warm and well-perfused, no cyanosis Pulmonary: non labored respirations    Left hip: No pain with hip range of motion  Left knee: Skin is intact, warm, and dry Motor and sensory intact, lower extremity warm and well-perfused  Disuse atrophy: None Effusion: Mild  Active ROM: 0 to 110 degrees of flexion, pain with deep flexion Passive ROM: Same as active Crepitation: None  Joint Line tenderness: Lateral joint line tenderness to palpation Patellar facet tenderness: None Patellar tracking: Midline Patellar mobility/apprehension: Normal/none  Ligamentous Exam: Negative anterior drawer, negative posterior drawer, stable to varus and valgus stress at 0 and 30 degrees of flexion  Alignment: Neutral [de-identified] : MRI images reviewed in detail with patient

## 2024-05-17 NOTE — DISCUSSION/SUMMARY
[de-identified] : Left knee medial meniscus posterior horn radial tear.  I discussed with her that with this type of meniscus tear, it does compromise the function of the meniscus and is essentially a meniscus root avulsion.  Her cartilage is overall intact in the knee with only mild thinning.  We discussed treatment options.  With nonoperative treatment, she could try injection and physical therapy and this could help with the pain however she would be at risk of developing arthritis in the knee as demonstrated in multiple studies.  We also discussed surgical treatment including medial meniscus repair.  We discussed that this does have the benefit of repairing the meniscus.  However it does require a relatively intensive rehabilitation including 6 weeks of nonweightbearing and in a brace followed by progressive mobilization.  Full recovery can take 6 months or longer.  She will think about her options and will continue on the anti-inflammatory for now.  She is going on a trip next week and will use the anti-inflammatory as needed for the pain.  I will see her back in 2 to 3 weeks for repeat evaluation.  I have personally obtained the history, reviewed the ROS as noted, and performed the physical examination today. The patient and I discussed the assessment and options and developed the plan. All questions were answered and the patient stated their understanding of the treatment plan and appreciation of the visit.  My cumulative time spent on this patient's visit included: Preparation for the visit, review of the medical records, review of pertinent diagnostic studies, examination and counseling of the patient on the above diagnosis, treatment plan and prognosis, orders of diagnostic tests, medications and/or appropriate procedures and documentation in the medical records of today's visit.  This note was generated using dragon medical dictation software.  A reasonable effort has been made for proofreading its contents, but typos may still remain.  If there are any questions or points of clarification needed please notify my office.

## 2024-05-17 NOTE — HISTORY OF PRESENT ILLNESS
[de-identified] : Interval history: 5/17/2024: She returns for follow-up of her left knee and for MRI review.  She says that the anti-inflammatory has been helping her and she has less pain than she did before.  5/14/24: RACHAEL WATERS  is a 39 year old F who presents with left knee pain.  She says that yesterday she was [place] when she felt her left knee give way and buckle.  After this, she began feeling pain on the posterior aspect of her left knee.  She was able to continue up the steps but then had to sit down.  After this, she had significant pain throughout the rest of the day.  She again localizes the pain to the posterior and lateral aspects of the knee.  She says it can be as bad as an 8 out of 10.  She has been taking Advil and Tylenol with some relief of the pain.  She does report some previous knee pain but says that has never been this bad.  She is also going on a trip next week so she would like to this to be improved prior to her trip.

## 2024-06-03 ENCOUNTER — APPOINTMENT (OUTPATIENT)
Dept: ORTHOPEDIC SURGERY | Facility: CLINIC | Age: 39
End: 2024-06-03
Payer: COMMERCIAL

## 2024-06-03 VITALS — BODY MASS INDEX: 47.09 KG/M2 | HEIGHT: 66 IN | WEIGHT: 293 LBS

## 2024-06-03 DIAGNOSIS — S83.232D COMPLEX TEAR OF MEDIAL MENISCUS, CURRENT INJURY, LEFT KNEE, SUBSEQUENT ENCOUNTER: ICD-10-CM

## 2024-06-03 PROCEDURE — 99204 OFFICE O/P NEW MOD 45 MIN: CPT

## 2024-06-03 PROCEDURE — 99214 OFFICE O/P EST MOD 30 MIN: CPT

## 2024-06-03 NOTE — DISCUSSION/SUMMARY
[de-identified] : 38 y/o female with left knee MM root tear  Patient presents with a root meniscus tear status post recent injury 2 weeks ago.  Patient reported some pre-existing pain, but MRI suggests acute radial tear of the posterior root of the medial meniscus with adjacent marrow edema in the tibial plateau. Due to the severity of the injury; we discussed the possibility of surgical intervention for repair of the meniscus versus nonsurgical management.  We discussed her BMI/weight, as well as the patient's age with respect to making a decision regarding surgical intervention and repair.  Considering the full-thickness root injury we discussed concern for accelerated degenerative changes within the medial compartment without attempts at surgical repair.  And that surgical repair would provide the best ability to salvage the medial compartment.  All risks, benefits and alternatives to the proposed surgical procedure, left root meniscus repair as well as the need for formal post-operative rehabilitation were discussed in great detail with the patient. Risk includes but are not limited to pain, bleeding, infection, neurovascular injury, stiffness, medical complications (including DVT, PE, MI), and risks of anesthesia. The patient expressed understanding and all questions were answered.   The patient is electing to proceed and will have the patient scheduled accordingly.

## 2024-06-03 NOTE — PHYSICAL EXAM
[de-identified] : Left knee exam  Skin: Clean, dry, intact Inspection: No obvious malalignment, no masses, min swelling, min effusion Pulses: 2+ DP/PT pulses ROM: 0-135 degrees of flexion. No pain with deep knee flexion/extension. Tenderness: + MJLT. No LJLT. No pain over the patella facets. No pain to the quadriceps tendon. No pain to the patella tendon. +posterior knee tenderness. Stability: Stable to varus, valgus. Negative lachman testing. Negative anterior drawer, negative posterior drawer. Strength: 5/5 Q/H/TA/GS/EHL, without atrophy Neuro: In tact to light touch throughout, DTR's normal Additional tests: + McMurrays test, Negative patellar grind test. Other: Patient is obese [de-identified] : Images were reviewed from [facility] dated 05/14/2024   4 views of the left knee were obtained 05/14/2024, that show no acute fracture or dislocation. There is no medial, no lateral and minimal patellofemoral degenerative changes seen. There is no significant malalignment. No significant other obvious osseous abnormality, otherwise unremarkable.  MRI of the left knee dated 5/16/2024 shows complete radial tear of the posterior root of the medial meniscus with adjacent marrow edema in the tibial plateau.  We independently reviewed and discussed in detail the images and the radiologic reports with the patient.

## 2024-06-03 NOTE — HISTORY OF PRESENT ILLNESS
[de-identified] : 38 y/o female with left knee pain. She has a past foot injury x1yr and attributes this to the start of her knee pain. Pain worsened x 2 weeks ago [place] subway. She localizes the pain to the posterior aspects of the knee. Patient c/o popping and cracking. She was seen by Dr. Lopes 05/14/2024 and 05/17/2024 for this injury and was referred here for a second opinion. Patient works in [employer].   The patient's past medical history, past surgical history, medications and allergies were reviewed by me today with the patient and documented accordingly. In addition, the patient's family and social history, which were noncontributory to this visit were reviewed also.

## 2024-06-03 NOTE — ADDENDUM
[FreeTextEntry1] : This note was written by Alina Montenegro on 06/03/2024 acting solely as a scribe for Dr. Adria Nguyễn.  All medical record entries made by the Scribe were at my, Dr. Adria Nguyễn, direction and personally dictated by me on 06/03/2024. I have personally reviewed the chart and agree that the record accurately reflects my personal performance of the history, physical exam, assessment and plan.

## 2024-06-07 ENCOUNTER — APPOINTMENT (OUTPATIENT)
Dept: ORTHOPEDIC SURGERY | Facility: CLINIC | Age: 39
End: 2024-06-07

## 2024-06-12 ENCOUNTER — OUTPATIENT (OUTPATIENT)
Dept: OUTPATIENT SERVICES | Facility: HOSPITAL | Age: 39
LOS: 1 days | End: 2024-06-12
Payer: COMMERCIAL

## 2024-06-12 VITALS
HEIGHT: 66 IN | SYSTOLIC BLOOD PRESSURE: 134 MMHG | WEIGHT: 293 LBS | DIASTOLIC BLOOD PRESSURE: 88 MMHG | TEMPERATURE: 98 F | HEART RATE: 70 BPM | RESPIRATION RATE: 16 BRPM | OXYGEN SATURATION: 98 %

## 2024-06-12 DIAGNOSIS — S83.242A OTHER TEAR OF MEDIAL MENISCUS, CURRENT INJURY, LEFT KNEE, INITIAL ENCOUNTER: ICD-10-CM

## 2024-06-12 DIAGNOSIS — Z01.818 ENCOUNTER FOR OTHER PREPROCEDURAL EXAMINATION: ICD-10-CM

## 2024-06-12 DIAGNOSIS — E66.01 MORBID (SEVERE) OBESITY DUE TO EXCESS CALORIES: ICD-10-CM

## 2024-06-12 DIAGNOSIS — Z98.890 OTHER SPECIFIED POSTPROCEDURAL STATES: Chronic | ICD-10-CM

## 2024-06-12 PROCEDURE — G0463: CPT

## 2024-06-12 RX ORDER — VALACYCLOVIR 500 MG/1
1 TABLET, FILM COATED ORAL
Refills: 0 | DISCHARGE

## 2024-06-12 NOTE — H&P PST ADULT - PROBLEM SELECTOR PLAN 1
Left Knee Arthroscopic Medial Meniscus Repair on 6/28/24  Pre-op education provided - all questions answered. Pt verbalized understanding

## 2024-06-12 NOTE — H&P PST ADULT - NSICDXPASTMEDICALHX_GEN_ALL_CORE_FT
PAST MEDICAL HISTORY:  2019 novel coronavirus disease (COVID-19)     Morbid obesity     Tear of medial meniscus of left knee

## 2024-06-12 NOTE — H&P PST ADULT - HISTORY OF PRESENT ILLNESS
40 y/o F with h/o Morbid obesity (BMI 53) c/o left knee pain s/p injury on 5/17/24 [place] when she felt her left knee give way and buckle. After this, she began feeling pain on the posterior aspect of her left knee, s/p MRI and found to have meniscus tear. Today she presents to PST for scheduled Left Knee Arthroscopic Medial Meniscus Repair on 6/28/24. Denies any palpitations, SOB, N/V, fever or chills.

## 2024-06-18 ENCOUNTER — APPOINTMENT (OUTPATIENT)
Dept: INTERNAL MEDICINE | Facility: CLINIC | Age: 39
End: 2024-06-18
Payer: COMMERCIAL

## 2024-06-18 VITALS
HEIGHT: 66 IN | HEART RATE: 66 BPM | WEIGHT: 293 LBS | SYSTOLIC BLOOD PRESSURE: 132 MMHG | OXYGEN SATURATION: 98 % | TEMPERATURE: 98.4 F | DIASTOLIC BLOOD PRESSURE: 84 MMHG | BODY MASS INDEX: 47.09 KG/M2

## 2024-06-18 DIAGNOSIS — E66.9 OBESITY, UNSPECIFIED: ICD-10-CM

## 2024-06-18 DIAGNOSIS — S83.282A OTHER TEAR OF LATERAL MENISCUS, CURRENT INJURY, LEFT KNEE, INITIAL ENCOUNTER: ICD-10-CM

## 2024-06-18 DIAGNOSIS — Z00.00 ENCOUNTER FOR GENERAL ADULT MEDICAL EXAMINATION W/OUT ABNORMAL FINDINGS: ICD-10-CM

## 2024-06-18 DIAGNOSIS — Z01.818 ENCOUNTER FOR OTHER PREPROCEDURAL EXAMINATION: ICD-10-CM

## 2024-06-18 PROCEDURE — 99386 PREV VISIT NEW AGE 40-64: CPT

## 2024-06-18 PROCEDURE — 36415 COLL VENOUS BLD VENIPUNCTURE: CPT

## 2024-06-18 NOTE — HISTORY OF PRESENT ILLNESS
[FreeTextEntry1] : CPE-new    -to establish care and for pre-op eval [de-identified] : 38 yo woman w h/o obesity, gerd No prior PCP  Torn left meniscus in knee,  repair 6/28/24 Dr. Nguyễn. Injury after a foot injury and subway steps. Was seeing Ortho foot/ankle Dr. Couch and recently got orthotics; using Meloxicam. Now off.  Prior surg: age 17 fell on ice and required plate in right leg/ankle   Has done [employer] and lost up to 120 lb on her own in the past. Recently regained most of it, long hours at job, home late, commuting, eating out a lot, no time for exercise.   Recent Gyn visit Dr. Divine Helm, Jennifer Fernandez. First episode HSV2, s/p valtrex.   at [employer].

## 2024-06-18 NOTE — ASSESSMENT
[FreeTextEntry1] : New pt to establish care.  Wt loss options discussed. She prefers program such as [employer], and using diet/exercise. We did discuss other options incl meds, surgery.  No contraindication to repair of meniscus.

## 2024-06-18 NOTE — HEALTH RISK ASSESSMENT
[Yes] : Yes [0] : 2) Feeling down, depressed, or hopeless: Not at all (0) [PHQ-2 Negative - No further assessment needed] : PHQ-2 Negative - No further assessment needed [Never] : Never [de-identified] : social, 2-5/wk [[id number]] : 0 [MammogramComments] : due for first [PapSmearDate] : 6/24

## 2024-06-19 LAB
25(OH)D3 SERPL-MCNC: 25.1 NG/ML
ALBUMIN SERPL ELPH-MCNC: 4.4 G/DL
ALP BLD-CCNC: 89 U/L
ALT SERPL-CCNC: 15 U/L
ANION GAP SERPL CALC-SCNC: 17 MMOL/L
AST SERPL-CCNC: 16 U/L
BILIRUB SERPL-MCNC: 0.3 MG/DL
BUN SERPL-MCNC: 16 MG/DL
CALCIUM SERPL-MCNC: 9.7 MG/DL
CHLORIDE SERPL-SCNC: 102 MMOL/L
CHOLEST SERPL-MCNC: 212 MG/DL
CO2 SERPL-SCNC: 21 MMOL/L
CREAT SERPL-MCNC: 0.83 MG/DL
EGFR: 91 ML/MIN/1.73M2
ESTIMATED AVERAGE GLUCOSE: 108 MG/DL
GLUCOSE SERPL-MCNC: 84 MG/DL
HBA1C MFR BLD HPLC: 5.4 %
HCT VFR BLD CALC: 42.1 %
HDLC SERPL-MCNC: 55 MG/DL
HGB BLD-MCNC: 14.1 G/DL
HIV1+2 AB SPEC QL IA.RAPID: NONREACTIVE
LDLC SERPL CALC-MCNC: 135 MG/DL
MCHC RBC-ENTMCNC: 29 PG
MCHC RBC-ENTMCNC: 33.5 GM/DL
MCV RBC AUTO: 86.4 FL
NONHDLC SERPL-MCNC: 157 MG/DL
PLATELET # BLD AUTO: NORMAL K/UL
POTASSIUM SERPL-SCNC: 4.5 MMOL/L
PROT SERPL-MCNC: 7.7 G/DL
RBC # BLD: 4.87 M/UL
RBC # FLD: 14 %
SODIUM SERPL-SCNC: 140 MMOL/L
TRIGL SERPL-MCNC: 124 MG/DL
TSH SERPL-ACNC: 3.18 UIU/ML
WBC # FLD AUTO: 11.14 K/UL

## 2024-06-20 LAB
HBV SURFACE AB SER QL: REACTIVE
HBV SURFACE AG SER QL: NONREACTIVE
HCV AB SER QL: NONREACTIVE
HCV S/CO RATIO: 0.21 S/CO

## 2024-06-28 ENCOUNTER — OUTPATIENT (OUTPATIENT)
Dept: INPATIENT UNIT | Facility: HOSPITAL | Age: 39
LOS: 1 days | End: 2024-06-28
Payer: COMMERCIAL

## 2024-06-28 ENCOUNTER — TRANSCRIPTION ENCOUNTER (OUTPATIENT)
Age: 39
End: 2024-06-28

## 2024-06-28 ENCOUNTER — APPOINTMENT (OUTPATIENT)
Dept: ORTHOPEDIC SURGERY | Facility: HOSPITAL | Age: 39
End: 2024-06-28

## 2024-06-28 VITALS
WEIGHT: 293 LBS | RESPIRATION RATE: 16 BRPM | HEART RATE: 78 BPM | HEIGHT: 66 IN | OXYGEN SATURATION: 98 % | DIASTOLIC BLOOD PRESSURE: 86 MMHG | SYSTOLIC BLOOD PRESSURE: 123 MMHG | TEMPERATURE: 98 F

## 2024-06-28 VITALS — HEART RATE: 80 BPM | RESPIRATION RATE: 16 BRPM | TEMPERATURE: 98 F | OXYGEN SATURATION: 96 %

## 2024-06-28 DIAGNOSIS — Z98.890 OTHER SPECIFIED POSTPROCEDURAL STATES: Chronic | ICD-10-CM

## 2024-06-28 DIAGNOSIS — S83.242A OTHER TEAR OF MEDIAL MENISCUS, CURRENT INJURY, LEFT KNEE, INITIAL ENCOUNTER: ICD-10-CM

## 2024-06-28 LAB
CLOSURE TME COLL+EPINEP BLD: SIGNIFICANT CHANGE UP (ref 150–400)
HCG UR QL: NEGATIVE — SIGNIFICANT CHANGE UP

## 2024-06-28 PROCEDURE — 29882 ARTHRS KNE SRG MNISC RPR M/L: CPT | Mod: LT

## 2024-06-28 PROCEDURE — 85049 AUTOMATED PLATELET COUNT: CPT

## 2024-06-28 PROCEDURE — 81025 URINE PREGNANCY TEST: CPT

## 2024-06-28 PROCEDURE — C1713: CPT

## 2024-06-28 PROCEDURE — 97161 PT EVAL LOW COMPLEX 20 MIN: CPT

## 2024-06-28 PROCEDURE — C9399: CPT

## 2024-06-28 DEVICE — IMP SYS REPAIR MENISCAL ROOT: Type: IMPLANTABLE DEVICE | Site: LEFT | Status: FUNCTIONAL

## 2024-06-28 DEVICE — ARTHREX SECONDARY FIXATION WITH PEEK SWIVELOCK ANCHOR 4.75 X 19.1MM: Type: IMPLANTABLE DEVICE | Site: LEFT | Status: FUNCTIONAL

## 2024-06-28 RX ORDER — SODIUM CHLORIDE 0.9 % (FLUSH) 0.9 %
3 SYRINGE (ML) INJECTION EVERY 8 HOURS
Refills: 0 | Status: DISCONTINUED | OUTPATIENT
Start: 2024-06-28 | End: 2024-06-28

## 2024-06-28 RX ORDER — MELOXICAM 15 MG/1
1 TABLET ORAL
Refills: 0 | DISCHARGE

## 2024-06-28 RX ORDER — FENTANYL CITRATE 50 UG/ML
25 INJECTION, SOLUTION INTRAMUSCULAR; INTRAVENOUS
Refills: 0 | Status: DISCONTINUED | OUTPATIENT
Start: 2024-06-28 | End: 2024-06-28

## 2024-06-28 RX ORDER — ASPIRIN 325 MG/1
1 TABLET, FILM COATED ORAL
Qty: 28 | Refills: 0
Start: 2024-06-28 | End: 2024-07-25

## 2024-06-28 RX ORDER — CEFAZOLIN 10 G/1
3000 INJECTION, POWDER, FOR SOLUTION INTRAVENOUS ONCE
Refills: 0 | Status: COMPLETED | OUTPATIENT
Start: 2024-06-28 | End: 2024-06-28

## 2024-06-28 RX ORDER — LIDOCAINE HYDROCHLORIDE 20 MG/ML
0.2 INJECTION, SOLUTION EPIDURAL; INFILTRATION; INTRACAUDAL; PERINEURAL ONCE
Refills: 0 | Status: DISCONTINUED | OUTPATIENT
Start: 2024-06-28 | End: 2024-06-28

## 2024-06-28 RX ADMIN — FENTANYL CITRATE 25 MICROGRAM(S): 50 INJECTION, SOLUTION INTRAMUSCULAR; INTRAVENOUS at 10:45

## 2024-06-28 RX ADMIN — FENTANYL CITRATE 25 MICROGRAM(S): 50 INJECTION, SOLUTION INTRAMUSCULAR; INTRAVENOUS at 10:25

## 2024-07-08 ENCOUNTER — NON-APPOINTMENT (OUTPATIENT)
Age: 39
End: 2024-07-08

## 2024-07-10 ENCOUNTER — APPOINTMENT (OUTPATIENT)
Dept: ORTHOPEDIC SURGERY | Facility: CLINIC | Age: 39
End: 2024-07-10
Payer: COMMERCIAL

## 2024-07-10 DIAGNOSIS — S83.232D COMPLEX TEAR OF MEDIAL MENISCUS, CURRENT INJURY, LEFT KNEE, SUBSEQUENT ENCOUNTER: ICD-10-CM

## 2024-07-10 PROBLEM — E66.01 MORBID (SEVERE) OBESITY DUE TO EXCESS CALORIES: Chronic | Status: ACTIVE | Noted: 2024-06-12

## 2024-07-10 PROBLEM — U07.1 COVID-19: Chronic | Status: ACTIVE | Noted: 2024-06-12

## 2024-07-10 PROBLEM — S83.242A OTHER TEAR OF MEDIAL MENISCUS, CURRENT INJURY, LEFT KNEE, INITIAL ENCOUNTER: Chronic | Status: ACTIVE | Noted: 2024-06-12

## 2024-07-10 PROCEDURE — 99024 POSTOP FOLLOW-UP VISIT: CPT

## 2024-08-14 ENCOUNTER — APPOINTMENT (OUTPATIENT)
Dept: ORTHOPEDIC SURGERY | Facility: CLINIC | Age: 39
End: 2024-08-14
Payer: COMMERCIAL

## 2024-08-14 VITALS — WEIGHT: 293 LBS | HEIGHT: 66 IN | BODY MASS INDEX: 47.09 KG/M2

## 2024-08-14 DIAGNOSIS — S83.232D COMPLEX TEAR OF MEDIAL MENISCUS, CURRENT INJURY, LEFT KNEE, SUBSEQUENT ENCOUNTER: ICD-10-CM

## 2024-08-14 PROCEDURE — 99024 POSTOP FOLLOW-UP VISIT: CPT

## 2024-08-19 NOTE — ADDENDUM
[FreeTextEntry1] : This note was written by Alina Montenegro on 08/14/2024 acting solely as a scribe for Dr. Adria Nguyễn.  All medical record entries made by the Scribe were at my, Dr. Adria Nguynễ, direction and personally dictated by me on 08/14/2024. I have personally reviewed the chart and agree that the record accurately reflects my personal performance of the history, physical exam, assessment and plan.

## 2024-08-19 NOTE — HISTORY OF PRESENT ILLNESS
[Clean/Dry/Intact] : clean, dry and intact [Healed] : healed [Neuro Intact] : an unremarkable neurological exam [Doing Well] : is doing well [Excellent Pain Control] : has excellent pain control [No Sign of Infection] : is showing no signs of infection [Chills] : no chills [Fever] : no fever [Nausea] : no nausea [Vomiting] : no vomiting [Erythema] : not erythematous [Discharge] : absent of discharge [Swelling] : not swollen [Dehiscence] : not dehisced [de-identified] : 39 year old F s/p left knee MMR (root) 6/28/24 [de-identified] : 39 year old F s/p left knee MMR. She is doing well presents in knee post op brace. Attending PT 2x per week ad has reached 90 degrees in flexion.  Takes Advil for pain as needed. Denies post op complications.  [de-identified] : Left knee exam  Skin: Incision(s) clean, dry, intact, no drainage, healed Inspection: Residual swelling, min residual effusion Pulses: 2+ DP/PT pulses ROM: 0-80 degrees Tenderness: diffuse Stability: Stable Strength: Intact Q/H/TA/GS/EHL Neuro: In tact to light touch throughout [de-identified] : The following radiographs were ordered and read by me during this patients visit. I reviewed each radiograph in detail with the patient and discussed the findings as highlighted below.   3 views of the left knee were obtained, 07/10/2024, that show an implant s/p MMR. No evidence of hardware failure, otherwise unremarkable. [de-identified] : 39 year old F s/p left knee MMR  The patient presents for second postoperative visit following MMR. Patient has had an uncomplicated post-operative course.  Discussion was centered upon the next phase of therapy as instructed.   Recommendations:  1. PT: Progressed to WBAT, FPROM/AROM to tolerance.   2. Therapeutic exercises: Continue Quad/Hamstring sets, co-contractions, SLR, HEP. 3. Meds: PRN NSAID's/Tylenol   4. Brace: Unlock and been weaning from [place]. Begin weightbearing with crutches and wean by 1-2 weeks.  Follow up 6 weeks

## 2024-08-19 NOTE — ADDENDUM
[FreeTextEntry1] : This note was written by Alina Montenegro on 08/14/2024 acting solely as a scribe for Dr. Adria Nguyễn.  All medical record entries made by the Scribe were at my, Dr. Adria Nguyễn, direction and personally dictated by me on 08/14/2024. I have personally reviewed the chart and agree that the record accurately reflects my personal performance of the history, physical exam, assessment and plan.

## 2024-08-19 NOTE — HISTORY OF PRESENT ILLNESS
[Clean/Dry/Intact] : clean, dry and intact [Healed] : healed [Neuro Intact] : an unremarkable neurological exam [Doing Well] : is doing well [Excellent Pain Control] : has excellent pain control [No Sign of Infection] : is showing no signs of infection [Chills] : no chills [Fever] : no fever [Nausea] : no nausea [Vomiting] : no vomiting [Erythema] : not erythematous [Discharge] : absent of discharge [Swelling] : not swollen [Dehiscence] : not dehisced [de-identified] : 39 year old F s/p left knee MMR (root) 6/28/24 [de-identified] : 39 year old F s/p left knee MMR. She is doing well presents in knee post op brace. Attending PT 2x per week ad has reached 90 degrees in flexion.  Takes Advil for pain as needed. Denies post op complications.  [de-identified] : Left knee exam  Skin: Incision(s) clean, dry, intact, no drainage, healed Inspection: Residual swelling, min residual effusion Pulses: 2+ DP/PT pulses ROM: 0-80 degrees Tenderness: diffuse Stability: Stable Strength: Intact Q/H/TA/GS/EHL Neuro: In tact to light touch throughout [de-identified] : The following radiographs were ordered and read by me during this patients visit. I reviewed each radiograph in detail with the patient and discussed the findings as highlighted below.   3 views of the left knee were obtained, 07/10/2024, that show an implant s/p MMR. No evidence of hardware failure, otherwise unremarkable. [de-identified] : 39 year old F s/p left knee MMR  The patient presents for second postoperative visit following MMR. Patient has had an uncomplicated post-operative course.  Discussion was centered upon the next phase of therapy as instructed.   Recommendations:  1. PT: Progressed to WBAT, FPROM/AROM to tolerance.   2. Therapeutic exercises: Continue Quad/Hamstring sets, co-contractions, SLR, HEP. 3. Meds: PRN NSAID's/Tylenol   4. Brace: Unlock and been weaning from [place]. Begin weightbearing with crutches and wean by 1-2 weeks.  Follow up 6 weeks

## 2024-08-27 ENCOUNTER — TRANSCRIPTION ENCOUNTER (OUTPATIENT)
Age: 39
End: 2024-08-27

## 2024-09-10 ENCOUNTER — TRANSCRIPTION ENCOUNTER (OUTPATIENT)
Age: 39
End: 2024-09-10

## 2024-09-16 ENCOUNTER — TRANSCRIPTION ENCOUNTER (OUTPATIENT)
Age: 39
End: 2024-09-16

## 2024-09-17 ENCOUNTER — APPOINTMENT (OUTPATIENT)
Dept: INTERNAL MEDICINE | Facility: CLINIC | Age: 39
End: 2024-09-17
Payer: COMMERCIAL

## 2024-09-17 VITALS
HEART RATE: 73 BPM | WEIGHT: 293 LBS | TEMPERATURE: 98.6 F | OXYGEN SATURATION: 98 % | BODY MASS INDEX: 47.09 KG/M2 | SYSTOLIC BLOOD PRESSURE: 109 MMHG | DIASTOLIC BLOOD PRESSURE: 77 MMHG | HEIGHT: 66 IN

## 2024-09-17 DIAGNOSIS — E66.9 OBESITY, UNSPECIFIED: ICD-10-CM

## 2024-09-17 PROCEDURE — 99213 OFFICE O/P EST LOW 20 MIN: CPT

## 2024-09-17 RX ORDER — TIRZEPATIDE 2.5 MG/.5ML
2.5 INJECTION, SOLUTION SUBCUTANEOUS
Qty: 4 | Refills: 0 | Status: ACTIVE | COMMUNITY
Start: 2024-09-17 | End: 2024-10-15

## 2024-09-17 NOTE — ASSESSMENT
[FreeTextEntry1] : 40 yo F w PMH obesity and meniscal tear repair presenting today to discuss weight loss options. Endorses that she has had successful weight loss with diet alone in the past but regains the weight after going back to old diet habits. She endorses cravings for sweets which she can have multiple servings of on a daily basis. She also has had limited physical activity due to recent surgery and is scheduled for PT as ordered by orthopedics for her knee. Patient not interested in bariatric surgery at this time. Referral to nutrition sent and prescription for Zepbound sent.

## 2024-09-17 NOTE — PHYSICAL EXAM
[No Acute Distress] : no acute distress [No Respiratory Distress] : no respiratory distress  [Clear to Auscultation] : lungs were clear to auscultation bilaterally [Normal Rate] : normal rate  [Normal S1, S2] : normal S1 and S2 [No Murmur] : no murmur heard [Normal] : normal rate, regular rhythm, normal S1 and S2 and no murmur heard [No Edema] : there was no peripheral edema [Soft] : abdomen soft [Non Tender] : non-tender [Normal Bowel Sounds] : normal bowel sounds

## 2024-09-17 NOTE — HISTORY OF PRESENT ILLNESS
[FreeTextEntry1] : Weight loss options [de-identified] : 38 yo F w [facility] obesity presenting today to discuss weight loss options. In the past she tried weight watchers and lost over 100lbs each time on three separate occasions but puts back on the weight after going back to old habits. She recently had knee surgery for torn meniscus which has reduced her physical activity. Her diet has been better lately but she does endorse a lot of sweets. She enjoys cakes, cookies, etc. She is not interested in surgery at this time but is contemplating starting on injectable weight loss medication.

## 2024-09-26 ENCOUNTER — APPOINTMENT (OUTPATIENT)
Dept: ORTHOPEDIC SURGERY | Facility: CLINIC | Age: 39
End: 2024-09-26
Payer: COMMERCIAL

## 2024-09-26 VITALS — BODY MASS INDEX: 47.09 KG/M2 | HEIGHT: 66 IN | WEIGHT: 293 LBS

## 2024-09-26 DIAGNOSIS — S83.232D COMPLEX TEAR OF MEDIAL MENISCUS, CURRENT INJURY, LEFT KNEE, SUBSEQUENT ENCOUNTER: ICD-10-CM

## 2024-09-26 PROCEDURE — 99024 POSTOP FOLLOW-UP VISIT: CPT

## 2024-09-27 NOTE — ADDENDUM
[FreeTextEntry1] : This note was written by Alina Montenegro on 09/26/2024 acting solely as a scribe for Dr. Adria Nguyễn.   All medical record entries made by the Scribe were at my, Dr. Adria Nguyễn, direction and personally dictated by me on 09/26/2024. I have personally reviewed the chart and agree that the record accurately reflects my personal performance of the history, physical exam, assessment and plan. 
[FreeTextEntry1] : This note was written by Alina Montenegro on 09/26/2024 acting solely as a scribe for Dr. Adria Nguyễn.   All medical record entries made by the Scribe were at my, Dr. Adria Nguyễn, direction and personally dictated by me on 09/26/2024. I have personally reviewed the chart and agree that the record accurately reflects my personal performance of the history, physical exam, assessment and plan. 
Unable to assess

## 2024-09-27 NOTE — HISTORY OF PRESENT ILLNESS
[Clean/Dry/Intact] : clean, dry and intact [Healed] : healed [Neuro Intact] : an unremarkable neurological exam [Doing Well] : is doing well [Excellent Pain Control] : has excellent pain control [No Sign of Infection] : is showing no signs of infection [Chills] : no chills [Fever] : no fever [Nausea] : no nausea [Vomiting] : no vomiting [Erythema] : not erythematous [Discharge] : absent of discharge [Swelling] : not swollen [Dehiscence] : not dehisced [de-identified] : 39 year old F s/p left knee MMR (root) 6/28/24 [de-identified] : 39 year old F s/p left knee MMR. She is doing well. Attending PT 2x per week. Reports no pain. Denies post op complications.  [de-identified] : Left knee exam  Skin: Incision(s) clean, dry, intact, no drainage, healed Inspection: Residual swelling, min residual effusion Pulses: 2+ DP/PT pulses ROM: 0-90 degrees Tenderness: none Stability: Stable Strength: Intact Q/H/TA/GS/EHL Neuro: In tact to light touch throughout [de-identified] :   3 views of the left knee were obtained, 07/10/2024, that show an implant s/p MMR. No evidence of hardware failure, otherwise unremarkable. [de-identified] : 39 year old F s/p left knee MMR  The patient presents for third postoperative visit following MMR. Patient has had an uncomplicated post-operative course.  Discussion was centered upon the next phase of therapy as instructed.   Recommendations:  1. Continue PT per protocol 2. Therapeutic exercises: Continue Quad/Hamstring sets, co-contractions, SLR, HEP. 3. Meds: PRN NSAID's/Tylenol   4. Brace: none 5. Restrictions: As discussed  Follow up 6-8 weeks

## 2024-09-27 NOTE — HISTORY OF PRESENT ILLNESS
[Clean/Dry/Intact] : clean, dry and intact [Healed] : healed [Neuro Intact] : an unremarkable neurological exam [Doing Well] : is doing well [Excellent Pain Control] : has excellent pain control [No Sign of Infection] : is showing no signs of infection [Chills] : no chills [Fever] : no fever [Nausea] : no nausea [Vomiting] : no vomiting [Erythema] : not erythematous [Discharge] : absent of discharge [Swelling] : not swollen [Dehiscence] : not dehisced [de-identified] : 39 year old F s/p left knee MMR (root) 6/28/24 [de-identified] : 39 year old F s/p left knee MMR. She is doing well. Attending PT 2x per week. Reports no pain. Denies post op complications.  [de-identified] : Left knee exam  Skin: Incision(s) clean, dry, intact, no drainage, healed Inspection: Residual swelling, min residual effusion Pulses: 2+ DP/PT pulses ROM: 0-90 degrees Tenderness: none Stability: Stable Strength: Intact Q/H/TA/GS/EHL Neuro: In tact to light touch throughout [de-identified] :   3 views of the left knee were obtained, 07/10/2024, that show an implant s/p MMR. No evidence of hardware failure, otherwise unremarkable. [de-identified] : 39 year old F s/p left knee MMR  The patient presents for third postoperative visit following MMR. Patient has had an uncomplicated post-operative course.  Discussion was centered upon the next phase of therapy as instructed.   Recommendations:  1. Continue PT per protocol 2. Therapeutic exercises: Continue Quad/Hamstring sets, co-contractions, SLR, HEP. 3. Meds: PRN NSAID's/Tylenol   4. Brace: none 5. Restrictions: As discussed  Follow up 6-8 weeks

## 2024-11-21 ENCOUNTER — APPOINTMENT (OUTPATIENT)
Dept: ORTHOPEDIC SURGERY | Facility: CLINIC | Age: 39
End: 2024-11-21
Payer: COMMERCIAL

## 2024-11-21 VITALS — WEIGHT: 293 LBS | HEIGHT: 66 IN | BODY MASS INDEX: 47.09 KG/M2

## 2024-11-21 DIAGNOSIS — S83.232D COMPLEX TEAR OF MEDIAL MENISCUS, CURRENT INJURY, LEFT KNEE, SUBSEQUENT ENCOUNTER: ICD-10-CM

## 2024-11-21 PROCEDURE — 99213 OFFICE O/P EST LOW 20 MIN: CPT

## 2025-01-31 ENCOUNTER — OUTPATIENT (OUTPATIENT)
Dept: OUTPATIENT SERVICES | Facility: HOSPITAL | Age: 40
LOS: 1 days | End: 2025-01-31
Payer: COMMERCIAL

## 2025-01-31 ENCOUNTER — APPOINTMENT (OUTPATIENT)
Dept: ULTRASOUND IMAGING | Facility: IMAGING CENTER | Age: 40
End: 2025-01-31
Payer: COMMERCIAL

## 2025-01-31 ENCOUNTER — APPOINTMENT (OUTPATIENT)
Dept: MAMMOGRAPHY | Facility: IMAGING CENTER | Age: 40
End: 2025-01-31
Payer: COMMERCIAL

## 2025-01-31 DIAGNOSIS — Z98.890 OTHER SPECIFIED POSTPROCEDURAL STATES: Chronic | ICD-10-CM

## 2025-01-31 DIAGNOSIS — Z00.8 ENCOUNTER FOR OTHER GENERAL EXAMINATION: ICD-10-CM

## 2025-01-31 PROCEDURE — 77067 SCR MAMMO BI INCL CAD: CPT | Mod: 26

## 2025-01-31 PROCEDURE — 77063 BREAST TOMOSYNTHESIS BI: CPT

## 2025-01-31 PROCEDURE — 77063 BREAST TOMOSYNTHESIS BI: CPT | Mod: 26

## 2025-01-31 PROCEDURE — 77067 SCR MAMMO BI INCL CAD: CPT

## 2025-01-31 PROCEDURE — 76641 ULTRASOUND BREAST COMPLETE: CPT | Mod: 26,50

## 2025-01-31 PROCEDURE — 76641 ULTRASOUND BREAST COMPLETE: CPT

## 2025-08-15 ENCOUNTER — APPOINTMENT (OUTPATIENT)
Dept: DERMATOLOGY | Facility: CLINIC | Age: 40
End: 2025-08-15
Payer: COMMERCIAL

## 2025-08-15 DIAGNOSIS — L30.4 ERYTHEMA INTERTRIGO: ICD-10-CM

## 2025-08-15 PROCEDURE — 99204 OFFICE O/P NEW MOD 45 MIN: CPT

## 2025-08-15 RX ORDER — FLUCONAZOLE 200 MG/1
200 TABLET ORAL
Qty: 4 | Refills: 0 | Status: ACTIVE | COMMUNITY
Start: 2025-08-15 | End: 1900-01-01

## 2025-08-15 RX ORDER — KETOCONAZOLE 20 MG/G
2 CREAM TOPICAL
Qty: 1 | Refills: 3 | Status: ACTIVE | COMMUNITY
Start: 2025-08-15 | End: 1900-01-01

## 2025-09-18 ENCOUNTER — NON-APPOINTMENT (OUTPATIENT)
Age: 40
End: 2025-09-18

## 2025-09-21 ENCOUNTER — NON-APPOINTMENT (OUTPATIENT)
Age: 40
End: 2025-09-21

## 2025-09-26 PROBLEM — D48.5 NEOPLASM OF UNCERTAIN BEHAVIOR OF SKIN: Status: ACTIVE | Noted: 2025-09-26

## (undated) DEVICE — GLV 6.5 PROTEXIS (WHITE)

## (undated) DEVICE — GLV 8.5 PROTEXIS (WHITE)

## (undated) DEVICE — DRAPE TOWEL 1010

## (undated) DEVICE — TUBING SUCTION 20FT

## (undated) DEVICE — DRAPE MAYO STAND 30"

## (undated) DEVICE — SOL IRR BAG NS 0.9% 3000ML

## (undated) DEVICE — PACK KNEE ARTHROSCOPY

## (undated) DEVICE — POSITIONER FOAM HEADREST (PINK)

## (undated) DEVICE — VENODYNE/SCD SLEEVE CALF MEDIUM

## (undated) DEVICE — POSITIONER FOAM EGG CRATE ULNAR 2PCS (PINK)

## (undated) DEVICE — DRSG STERISTRIPS 0.5 X 4"

## (undated) DEVICE — S&N ARTHROCARE WAND SABER 30 DEGREE 3MM

## (undated) DEVICE — DRAPE INSTRUMENT POUCH 6.75" X 11"

## (undated) DEVICE — SUT MONOSOF 3-0 18" C-14

## (undated) DEVICE — LAP PAD 18 X 18"

## (undated) DEVICE — GLV 7.5 PROTEXIS (WHITE)

## (undated) DEVICE — MARKING PEN W RULER

## (undated) DEVICE — WARMING BLANKET UPPER ADULT

## (undated) DEVICE — GOWN TRIMAX LG

## (undated) DEVICE — SOL IRR POUR NS 0.9% 500ML

## (undated) DEVICE — SPECIMEN CONTAINER 100ML

## (undated) DEVICE — MEDICATION LABELS W MARKER

## (undated) DEVICE — DRSG WEBRIL 4"

## (undated) DEVICE — GLV 9 DURAPRENE

## (undated) DEVICE — CANNULA LINVATEC NON-FENESTRATED 6MM 75MM

## (undated) DEVICE — BRACE KNEE IMMOBILIZER SPLINT SUPER LARGE 20"

## (undated) DEVICE — DRAPE TOWEL BLUE 17" X 24"

## (undated) DEVICE — TOURNIQUET CUFF 34" DUAL PORT W PLC

## (undated) DEVICE — CANNULA LINVATEC NON-FENESTRATED 8.4MM 75MM

## (undated) DEVICE — GLV 8 PROTEXIS (WHITE)

## (undated) DEVICE — SOL IRR POUR H2O 250ML

## (undated) DEVICE — VISITEC 4X4

## (undated) DEVICE — GLV 7 PROTEXIS (WHITE)